# Patient Record
Sex: FEMALE | Race: WHITE | NOT HISPANIC OR LATINO | URBAN - METROPOLITAN AREA
[De-identification: names, ages, dates, MRNs, and addresses within clinical notes are randomized per-mention and may not be internally consistent; named-entity substitution may affect disease eponyms.]

---

## 2018-08-31 ENCOUNTER — OFFICE (OUTPATIENT)
Dept: URBAN - METROPOLITAN AREA CLINIC 4 | Facility: CLINIC | Age: 63
End: 2018-08-31

## 2018-08-31 VITALS — WEIGHT: 135 LBS | DIASTOLIC BLOOD PRESSURE: 67 MMHG | SYSTOLIC BLOOD PRESSURE: 106 MMHG | HEIGHT: 62 IN

## 2018-08-31 DIAGNOSIS — K62.5 HEMORRHAGE OF ANUS AND RECTUM: ICD-10-CM

## 2018-08-31 PROCEDURE — 99213 OFFICE O/P EST LOW 20 MIN: CPT | Performed by: NURSE PRACTITIONER

## 2018-08-31 RX ORDER — HYDROCORTISONE ACETATE AND PRAMOXINE HYDROCHLORIDE 25; 10 MG/G; MG/G
CREAM TOPICAL
Qty: 30 | Refills: 3 | Status: ACTIVE
Start: 2018-08-31

## 2018-10-09 ENCOUNTER — ON CAMPUS - OUTPATIENT (OUTPATIENT)
Dept: URBAN - METROPOLITAN AREA HOSPITAL 22 | Facility: HOSPITAL | Age: 63
End: 2018-10-09
Payer: COMMERCIAL

## 2018-10-09 DIAGNOSIS — Z86.010 PERSONAL HISTORY OF COLONIC POLYPS: ICD-10-CM

## 2018-10-09 DIAGNOSIS — Z80.9 FAMILY HISTORY OF MALIGNANT NEOPLASM, UNSPECIFIED: ICD-10-CM

## 2018-10-09 PROCEDURE — 45378 DIAGNOSTIC COLONOSCOPY: CPT | Mod: 33 | Performed by: INTERNAL MEDICINE

## 2019-05-16 ENCOUNTER — TELEPHONE (OUTPATIENT)
Dept: NEUROSURGERY | Facility: CLINIC | Age: 64
End: 2019-05-16

## 2019-05-16 DIAGNOSIS — D32.9 MENINGIOMA (HCC): Primary | ICD-10-CM

## 2019-06-11 ENCOUNTER — HOSPITAL ENCOUNTER (OUTPATIENT)
Dept: MRI IMAGING | Facility: HOSPITAL | Age: 64
Discharge: HOME | End: 2019-06-11
Admitting: NEUROLOGICAL SURGERY

## 2019-06-11 ENCOUNTER — OFFICE VISIT (OUTPATIENT)
Dept: NEUROSURGERY | Facility: CLINIC | Age: 64
End: 2019-06-11

## 2019-06-11 VITALS
TEMPERATURE: 97.3 F | SYSTOLIC BLOOD PRESSURE: 102 MMHG | DIASTOLIC BLOOD PRESSURE: 68 MMHG | WEIGHT: 133.2 LBS | BODY MASS INDEX: 23.6 KG/M2 | HEIGHT: 63 IN

## 2019-06-11 DIAGNOSIS — D32.9 MENINGIOMA (HCC): ICD-10-CM

## 2019-06-11 DIAGNOSIS — D32.9 MENINGIOMA (HCC): Primary | ICD-10-CM

## 2019-06-11 PROCEDURE — 0 GADOBENATE DIMEGLUMINE 529 MG/ML SOLUTION: Performed by: NEUROLOGICAL SURGERY

## 2019-06-11 PROCEDURE — 82565 ASSAY OF CREATININE: CPT

## 2019-06-11 PROCEDURE — 99213 OFFICE O/P EST LOW 20 MIN: CPT | Performed by: NEUROLOGICAL SURGERY

## 2019-06-11 PROCEDURE — A9577 INJ MULTIHANCE: HCPCS | Performed by: NEUROLOGICAL SURGERY

## 2019-06-11 PROCEDURE — 70553 MRI BRAIN STEM W/O & W/DYE: CPT

## 2019-06-11 RX ORDER — ZOLMITRIPTAN 2.5 MG/1
2.5 TABLET, FILM COATED ORAL ONCE AS NEEDED
COMMUNITY

## 2019-06-11 RX ORDER — ESCITALOPRAM OXALATE 20 MG/1
TABLET ORAL
COMMUNITY
Start: 2019-04-26

## 2019-06-11 RX ORDER — EVOLOCUMAB 140 MG/ML
INJECTION, SOLUTION SUBCUTANEOUS
COMMUNITY
Start: 2019-05-21

## 2019-06-11 RX ORDER — METOPROLOL SUCCINATE 25 MG
TABLET, EXTENDED RELEASE 24 HR ORAL
Refills: 1 | COMMUNITY
Start: 2019-03-12

## 2019-06-11 RX ADMIN — GADOBENATE DIMEGLUMINE 12 ML: 529 INJECTION, SOLUTION INTRAVENOUS at 14:04

## 2019-06-11 NOTE — PATIENT INSTRUCTIONS
If you need:     Call Dr. Means on a Monday or Tuesday with an update.      Ask for Desire () and leave a message for  Dr. Means.   He will call you back at the end of the day as soon as he can.     [phone]

## 2019-06-11 NOTE — PROGRESS NOTES
Jessica Guerriergrass  1955  [id number]      Chief Complaint   Patient presents with   • Brain Tumor       HISTORY OF PRESENT ILLNESS: This is a 64-year-old female who has been followed for several years with an asymptomatic meningioma in the pineal region.  He has remained asymptomatic.  She has not been seen for approximately 5 years and returns for follow-up MRI.    Past Medical History:   Diagnosis Date   • Headache        Past Surgical History:   Procedure Laterality Date   • CHOLECYSTECTOMY     • TUMOR REMOVAL      throat       Family History   Problem Relation Age of Onset   • Cancer Mother    • Heart disease Father        Social History     Socioeconomic History   • Marital status:      Spouse name: Not on file   • Number of children: Not on file   • Years of education: Not on file   • Highest education level: Not on file   Tobacco Use   • Smoking status: Never Smoker   • Smokeless tobacco: Never Used   Substance and Sexual Activity   • Alcohol use: Yes   • Drug use: No   • Sexual activity: Defer       Allergies   Allergen Reactions   • Aspirin GI Intolerance   • Codeine GI Intolerance   • Erythromycin GI Intolerance   • Penicillins Other (See Comments)     Tested positive for allergy    • Sulfa Antibiotics Other (See Comments)     Flu like symptoms           Current Outpatient Medications:   •  escitalopram (LEXAPRO) 20 MG tablet, , Disp: , Rfl:   •  REPATHA SURECLICK 140 MG/ML solution auto-injector, , Disp: , Rfl:   •  TOPROL XL 25 MG 24 hr tablet, TK ONE T PO QD WITH A MEAL, Disp: , Rfl: 1  •  ZOLMitriptan (ZOMIG) 2.5 MG tablet, Take 2.5 mg by mouth 1 (One) Time As Needed for Migraine., Disp: , Rfl:   No current facility-administered medications for this visit.     Review of Systems   Constitutional: Negative for activity change, appetite change, chills, diaphoresis, fatigue, fever and unexpected weight change.   HENT: Negative for congestion, dental problem, drooling, ear discharge, ear  "pain, facial swelling, hearing loss, mouth sores, nosebleeds, postnasal drip, rhinorrhea, sinus pressure, sneezing, sore throat, tinnitus, trouble swallowing and voice change.    Eyes: Negative for photophobia, pain, discharge, redness, itching and visual disturbance.   Respiratory: Negative for apnea, cough, choking, chest tightness, shortness of breath, wheezing and stridor.    Cardiovascular: Negative for chest pain, palpitations and leg swelling.   Gastrointestinal: Negative for abdominal distention, abdominal pain, anal bleeding, blood in stool, constipation, diarrhea, nausea, rectal pain and vomiting.   Endocrine: Negative for cold intolerance, heat intolerance, polydipsia, polyphagia and polyuria.   Genitourinary: Negative for decreased urine volume, difficulty urinating, dysuria, enuresis, flank pain, frequency, genital sores, hematuria and urgency.   Musculoskeletal: Positive for neck pain. Negative for arthralgias, back pain, gait problem, joint swelling, myalgias and neck stiffness.   Skin: Negative for color change, pallor, rash and wound.   Allergic/Immunologic: Negative for environmental allergies, food allergies and immunocompromised state.   Neurological: Negative for dizziness, tremors, seizures, syncope, facial asymmetry, speech difficulty, weakness, light-headedness, numbness and headaches.   Hematological: Negative for adenopathy. Does not bruise/bleed easily.   Psychiatric/Behavioral: Negative for agitation, behavioral problems, confusion, decreased concentration, dysphoric mood, hallucinations, self-injury, sleep disturbance and suicidal ideas. The patient is not nervous/anxious and is not hyperactive.        Vitals:    06/11/19 1530   BP: 102/68   BP Location: Left arm   Patient Position: Sitting   Cuff Size: Adult   Temp: 97.3 °F (36.3 °C)   TempSrc: Temporal   Weight: 60.4 kg (133 lb 3.2 oz)   Height: 158.8 cm (62.5\")       Neurological Examination:    Mental status/speech: The patient is " alert and oriented.  Speech is clear without aphysia or dysarthria.  No overt cognitive deficits.    Cranial nerve examination:    Olfaction: Smell is intact.  Vision: Vision is intact without visual field abnormalities.  Funduscopic examination is normal.  No pupillary irregularity.  Ocular motor examination: The extraocular muscles are intact.  There is no diplopia.  The pupil is round and reactive to both light and accommodation.  There is no nystagmus.  Facial movement/sensation: There is no facial weakness.  Sensation is intact in the first, second, and third divisions of the trigeminal nerve.  The corneal reflex is intact.  Auditory: Hearing is intact to finger rub bilaterally.  Cranial nerves IX, X, XI, XII: Phonation is normal.  No dysphagia.  Tongue is protruded in the midline without atrophy.  The gag reflex is intact.  Shoulder shrug is normal.    Musculoligamentous ligamentous examination: Weakness, sensory loss or reflex asymmetry.  Gait normal.    Medical Decision Making:     Diagnostic Data Set: The MRI data set shows slight enlargement of the neoplasm of approximately 3-4 mm.  There is no abnormality in the brain.  No evidence of edema or peritumoral abnormalities.      Assessment: Pineal region meningioma          Recommendations: Although she is asymptomatic we have MRI evidence that this is increased in size in the past 5 years.  I will discuss with S[facility] in my neurosurgical colleagues as to the best management at this time.  I will keep you informed.        I greatly appreciate the opportunity to see and evaluate this individual.  If you have questions or concerns regarding issues that I may have overlooked please call me at any time: [phone].  Koby Means M.D.  [facility]  [address] [place] [address]    Scribed for Pete Maens MD by Fernando Khan CMA. 6/11/2019  3:53 PM    I have read and concur with the information provided by the scribe.  Pete  MD Miguelangel

## 2019-06-14 LAB — CREAT BLDA-MCNC: 0.7 MG/DL (ref 0.6–1.3)

## 2019-08-12 ENCOUNTER — TRANSCRIBE ORDERS (OUTPATIENT)
Dept: ADMINISTRATIVE | Facility: HOSPITAL | Age: 64
End: 2019-08-12

## 2019-08-12 DIAGNOSIS — Z12.31 VISIT FOR SCREENING MAMMOGRAM: Primary | ICD-10-CM

## 2019-08-12 DIAGNOSIS — N63.22 BREAST LUMP ON LEFT SIDE AT 10 O'CLOCK POSITION: ICD-10-CM

## 2019-08-30 ENCOUNTER — APPOINTMENT (OUTPATIENT)
Dept: MAMMOGRAPHY | Facility: HOSPITAL | Age: 64
End: 2019-08-30

## 2019-09-04 ENCOUNTER — TELEPHONE (OUTPATIENT)
Dept: NEUROSURGERY | Facility: CLINIC | Age: 64
End: 2019-09-04

## 2019-09-04 NOTE — TELEPHONE ENCOUNTER
"Provider:  Miguelangel  Caller: Pt Daughter   Phone #:  [phone]  Surgery:  Brain Surgery @ [facility]  Surgery Date:  8/2019  Last visit:   6/11/19  Next visit: 9/10/19    JOSIE:         Reason for call:           Pt daughter called stating that the pt has returned home from [facility], states that the pt is having hallucinations, very irritable, and is telling \"strange stories\". Daughter is very concerned and is wanting to move the pt post op visit up if possible? Please adv  "

## 2019-09-04 NOTE — TELEPHONE ENCOUNTER
"Patient's surgery was 8/26/2019.  Daughter says her mother was having \"flashing light\" and seeing things in the hospital.  They started keppra, 1500 BID.  Flashing lights improved but she is still having vivid images when she closes her eyes and hallucinations when awake.  No neuro/cognitive changes. She is on decadron wean. Will discontinue Friday. No incisional issues. I reassured her this will take some time to clear. She should call our office if symptoms worsen or any neuro changes which I have discussed.   I have informed Dr. Means as well.  We will see patient next Tuesday.      I discussed above with patient's daughter  "

## 2019-09-10 ENCOUNTER — OFFICE VISIT (OUTPATIENT)
Dept: NEUROSURGERY | Facility: CLINIC | Age: 64
End: 2019-09-10

## 2019-09-10 VITALS
BODY MASS INDEX: 23.53 KG/M2 | HEIGHT: 63 IN | WEIGHT: 132.8 LBS | DIASTOLIC BLOOD PRESSURE: 66 MMHG | TEMPERATURE: 97 F | SYSTOLIC BLOOD PRESSURE: 100 MMHG

## 2019-09-10 DIAGNOSIS — D32.9 MENINGIOMA (HCC): Primary | ICD-10-CM

## 2019-09-10 DIAGNOSIS — Z98.890 STATUS POST CRANIOTOMY: ICD-10-CM

## 2019-09-10 PROBLEM — R56.9 SEIZURES (HCC): Status: ACTIVE | Noted: 2019-09-10

## 2019-09-10 PROCEDURE — 99214 OFFICE O/P EST MOD 30 MIN: CPT | Performed by: PHYSICIAN ASSISTANT

## 2019-09-10 RX ORDER — LEVETIRACETAM 750 MG/1
1500 TABLET ORAL 4 TIMES DAILY
COMMUNITY
Start: 2019-08-30 | End: 2019-09-10

## 2019-09-10 RX ORDER — LEVETIRACETAM 500 MG/1
500 TABLET ORAL 2 TIMES DAILY
Qty: 60 TABLET | Refills: 3 | Status: SHIPPED | OUTPATIENT
Start: 2019-09-10 | End: 2020-01-09 | Stop reason: SDUPTHER

## 2019-09-10 RX ORDER — OXYCODONE HYDROCHLORIDE 5 MG/1
TABLET ORAL
Refills: 0 | COMMUNITY
Start: 2019-08-31 | End: 2020-06-16

## 2019-09-10 RX ORDER — FLUTICASONE PROPIONATE 50 MCG
SPRAY, SUSPENSION (ML) NASAL
COMMUNITY

## 2019-09-10 RX ORDER — LORAZEPAM 0.5 MG/1
TABLET ORAL
Refills: 0 | COMMUNITY
Start: 2019-08-31 | End: 2020-06-16

## 2019-09-10 RX ORDER — ESTRADIOL 10 UG/1
INSERT VAGINAL
COMMUNITY
End: 2020-11-12 | Stop reason: SDUPTHER

## 2019-09-10 RX ORDER — LEVETIRACETAM 750 MG/1
750 TABLET ORAL 2 TIMES DAILY
Qty: 60 TABLET | Refills: 3 | Status: SHIPPED | OUTPATIENT
Start: 2019-09-10 | End: 2021-06-22

## 2019-09-10 RX ORDER — PANTOPRAZOLE SODIUM 20 MG/1
20 TABLET, DELAYED RELEASE ORAL
COMMUNITY
Start: 2019-08-30 | End: 2019-09-29

## 2019-09-10 RX ORDER — ASPIRIN 325 MG
325 TABLET ORAL DAILY
COMMUNITY
Start: 2019-08-31 | End: 2019-09-10

## 2019-09-10 NOTE — PROGRESS NOTES
Jessica Guerriergrass  1955  09/10/2019  [id number]    CC:for suture removal    HPI:  63 yo female with progression of a pineal region meningioma. She had craniotomy at [facility], she presents today for suture removal. She had MRI post op that Dr. Means has reviewed. 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 She now has clear vision and would like to decrease the Keppra is possible.    Past Medical History:   Diagnosis Date   • Headache        Allergies   Allergen Reactions   • Aspirin GI Intolerance   • Codeine GI Intolerance   • Erythromycin GI Intolerance   • Penicillins Other (See Comments)     Tested positive for allergy    • Sulfa Antibiotics Other (See Comments)     Flu like symptoms           Current Outpatient Medications:   •  aspirin 325 MG tablet, Take 325 mg by mouth Daily., Disp: , Rfl:   •  Bacillus Coagulans-Inulin (PROBIOTIC FORMULA PO), Probiotic Formula 10 billion cell(2 billion ea) capsule, Disp: , Rfl:   •  escitalopram (LEXAPRO) 20 MG tablet, , Disp: , Rfl:   •  estradiol (YUVAFEM) 10 MCG tablet vaginal tablet, Yuvafem 10 mcg vaginal tablet, Disp: , Rfl:   •  fluticasone (FLONASE) 50 MCG/ACT nasal spray, fluticasone propionate 50 mcg/actuation nasal spray,suspension, Disp: , Rfl:   •  levETIRAcetam (KEPPRA) 750 MG tablet, Take 1,500 mg by mouth 4 (Four) Times a Day., Disp: , Rfl:   •  LORazepam (ATIVAN) 0.5 MG tablet, PLEASE SEE ATTACHED FOR DETAILED DIRECTIONS, Disp: , Rfl: 0  •  oxyCODONE (ROXICODONE) 5 MG immediate release tablet, TAKE 1 TABLET BY MOUTH EVERY 4 HOURS AS NEEDED FOR PAIN. TAKE WITH FOOD. WEAN TO OFF AS DIRECTED, Disp: , Rfl: 0  •  pantoprazole (PROTONIX) 20 MG EC tablet, Take 20 mg by mouth., Disp: , Rfl:   •  REPATHA SURECLICK 140 MG/ML solution auto-injector, , Disp: , Rfl:   •  SENNOSIDES-DOCUSATE SODIUM PO, Take 1-2 tablets by mouth., Disp: , Rfl:   •  TOPROL XL 25 MG 24 hr tablet, TK ONE T PO QD WITH  A MEAL, Disp: , Rfl: 1  •  ZOLMitriptan (ZOMIG) 2.5 MG tablet, Take 2.5 mg by mouth 1 (One) Time As Needed for Migraine., Disp: , Rfl:     Review of Systems   Constitutional: Negative for activity change, appetite change, chills, diaphoresis, fatigue, fever and unexpected weight change.   HENT: Positive for mouth sores and rhinorrhea. Negative for congestion, dental problem, drooling, ear discharge, ear pain, facial swelling, hearing loss, nosebleeds, postnasal drip, sinus pressure, sneezing, sore throat, tinnitus, trouble swallowing and voice change.    Eyes: Negative for photophobia, pain, discharge, redness, itching and visual disturbance.   Respiratory: Negative for apnea, cough, choking, chest tightness, shortness of breath, wheezing and stridor.    Cardiovascular: Negative for chest pain, palpitations and leg swelling.   Gastrointestinal: Negative for abdominal distention, abdominal pain, anal bleeding, blood in stool, constipation, diarrhea, nausea, rectal pain and vomiting.   Endocrine: Negative for cold intolerance, heat intolerance, polydipsia, polyphagia and polyuria.   Genitourinary: Negative for decreased urine volume, difficulty urinating, dysuria, enuresis, flank pain, frequency, genital sores, hematuria and urgency.   Musculoskeletal: Negative for arthralgias, back pain, gait problem, joint swelling, myalgias, neck pain and neck stiffness.   Skin: Positive for rash. Negative for color change, pallor and wound.   Allergic/Immunologic: Negative for environmental allergies, food allergies and immunocompromised state.   Neurological: Positive for seizures. Negative for dizziness, tremors, syncope, facial asymmetry, speech difficulty, weakness, light-headedness, numbness and headaches.   Hematological: Negative for adenopathy. Does not bruise/bleed easily.   Psychiatric/Behavioral: Negative for agitation, behavioral problems, confusion, decreased concentration, dysphoric mood, hallucinations, self-injury,  "sleep disturbance and suicidal ideas. The patient is not nervous/anxious and is not hyperactive.    All other systems reviewed and are negative.      PE:  /66 (BP Location: Right arm, Patient Position: Sitting, Cuff Size: Adult)   Temp 97 °F (36.1 °C) (Temporal)   Ht 158.8 cm (62.5\")   Wt 60.2 kg (132 lb 12.8 oz)   BMI 23.90 kg/m²   Heart- RRR  Lungs- no wheezing, normal expansion    Wound-well healed, flat, no drainage. Staples were intact.    Neurologic Exam:  Motor and gait intact. Vision intact.    MDM   Patient will f/u in December with new brain MRI. She will call with any questions.    Ghazala Logan, PAC    "

## 2019-09-12 DIAGNOSIS — D32.9 MENINGIOMA (HCC): Primary | ICD-10-CM

## 2019-09-12 DIAGNOSIS — Z98.890 STATUS POST CRANIOTOMY: ICD-10-CM

## 2019-10-15 ENCOUNTER — TELEPHONE (OUTPATIENT)
Dept: NEUROSURGERY | Facility: CLINIC | Age: 64
End: 2019-10-15

## 2019-10-15 DIAGNOSIS — Z98.890 STATUS POST CRANIOTOMY: ICD-10-CM

## 2019-10-15 DIAGNOSIS — D32.9 MENINGIOMA (HCC): Primary | ICD-10-CM

## 2019-10-15 NOTE — TELEPHONE ENCOUNTER
Per Miguelangel he would like to see pt back in office in December with new scans which looks like is already scheduled.  I have called pt to let her know.      She informed me that [facility] wanted to order an EEG to get an update on her seizure activity but were unable to get it schedule while she was in town.  She asked if this is something we could order for her.  Miguelangel approved.

## 2019-10-15 NOTE — TELEPHONE ENCOUNTER
Provider:  Miguelangel  Caller: patient  Time of call:  1:59   Phone #:  [phone]  Surgery:  No-Meningioma  Surgery Date:    Last visit:   09/10/19  Next visit: 12/10/19    JOSIE:         Reason for call:     Patient called to let Dr. Means know that she had her post-op apt with [facility] today.    They suggested that she follows up with Dr. Means on her next visit.

## 2019-11-04 ENCOUNTER — TELEPHONE (OUTPATIENT)
Dept: NEUROSURGERY | Facility: CLINIC | Age: 64
End: 2019-11-04

## 2019-11-04 DIAGNOSIS — D32.9 MENINGIOMA (HCC): ICD-10-CM

## 2019-11-04 DIAGNOSIS — Z98.890 STATUS POST CRANIOTOMY: Primary | ICD-10-CM
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
## 2019-11-07 ENCOUNTER — OFFICE VISIT (OUTPATIENT)
Dept: NEUROSURGERY | Facility: CLINIC | Age: 64
End: 2019-11-07

## 2019-11-07 ENCOUNTER — HOSPITAL ENCOUNTER (OUTPATIENT)
Dept: CT IMAGING | Facility: HOSPITAL | Age: 64
Discharge: HOME | End: 2019-11-07
Admitting: PHYSICIAN ASSISTANT

## 2019-11-07 VITALS
DIASTOLIC BLOOD PRESSURE: 70 MMHG | HEIGHT: 66 IN | BODY MASS INDEX: 22.02 KG/M2 | SYSTOLIC BLOOD PRESSURE: 112 MMHG | WEIGHT: 137 LBS | TEMPERATURE: 97 F

## 2019-11-07 DIAGNOSIS — R56.9 SEIZURES (HCC): ICD-10-CM

## 2019-11-07 DIAGNOSIS — D32.9 MENINGIOMA (HCC): ICD-10-CM

## 2019-11-07 DIAGNOSIS — G44.89 OTHER HEADACHE SYNDROME: ICD-10-CM

## 2019-11-07 DIAGNOSIS — Z98.890 STATUS POST CRANIOTOMY: ICD-10-CM

## 2019-11-07 DIAGNOSIS — Z98.890 STATUS POST CRANIOTOMY: Primary | ICD-10-CM

## 2019-11-07 PROBLEM — R51.9 HEADACHE: Status: ACTIVE | Noted: 2019-11-07

## 2019-11-07 PROCEDURE — 70450 CT HEAD/BRAIN W/O DYE: CPT

## 2019-11-07 PROCEDURE — 99213 OFFICE O/P EST LOW 20 MIN: CPT | Performed by: PHYSICIAN ASSISTANT

## 2019-11-07 RX ORDER — PANTOPRAZOLE SODIUM 20 MG/1
20 TABLET, DELAYED RELEASE ORAL DAILY
COMMUNITY
End: 2019-11-07

## 2019-11-07 RX ORDER — PANTOPRAZOLE SODIUM 20 MG/1
20 TABLET, DELAYED RELEASE ORAL 2 TIMES DAILY
Qty: 60 TABLET | Refills: 5 | Status: SHIPPED | OUTPATIENT
Start: 2019-11-07 | End: 2020-12-10
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
## 2019-12-02 ENCOUNTER — APPOINTMENT (OUTPATIENT)
Dept: NEUROLOGY | Facility: HOSPITAL | Age: 64
End: 2019-12-02

## 2019-12-05 ENCOUNTER — HOSPITAL ENCOUNTER (OUTPATIENT)
Dept: NEUROLOGY | Facility: HOSPITAL | Age: 64
Discharge: HOME | End: 2019-12-05
Admitting: NEUROLOGICAL SURGERY

## 2019-12-05 PROCEDURE — 95816 EEG AWAKE AND DROWSY: CPT

## 2019-12-09 ENCOUNTER — HOSPITAL ENCOUNTER (OUTPATIENT)
Dept: ULTRASOUND IMAGING | Facility: HOSPITAL | Age: 64
Discharge: HOME | End: 2019-12-09

## 2019-12-09 ENCOUNTER — HOSPITAL ENCOUNTER (OUTPATIENT)
Dept: MAMMOGRAPHY | Facility: HOSPITAL | Age: 64
Discharge: HOME | End: 2019-12-09
Admitting: OBSTETRICS & GYNECOLOGY

## 2019-12-09 DIAGNOSIS — N63.22 BREAST LUMP ON LEFT SIDE AT 10 O'CLOCK POSITION: ICD-10-CM

## 2019-12-09 PROCEDURE — 77066 DX MAMMO INCL CAD BI: CPT | Performed by: RADIOLOGY

## 2019-12-09 PROCEDURE — 77062 BREAST TOMOSYNTHESIS BI: CPT | Performed by: RADIOLOGY

## 2019-12-09 PROCEDURE — 77066 DX MAMMO INCL CAD BI: CPT

## 2019-12-09 PROCEDURE — G0279 TOMOSYNTHESIS, MAMMO: HCPCS

## 2019-12-09 PROCEDURE — 76642 ULTRASOUND BREAST LIMITED: CPT

## 2019-12-09 PROCEDURE — 76642 ULTRASOUND BREAST LIMITED: CPT | Performed by: RADIOLOGY

## 2019-12-10 ENCOUNTER — OFFICE VISIT (OUTPATIENT)
Dept: NEUROSURGERY | Facility: CLINIC | Age: 64
End: 2019-12-10

## 2019-12-10 ENCOUNTER — HOSPITAL ENCOUNTER (OUTPATIENT)
Dept: MRI IMAGING | Facility: HOSPITAL | Age: 64
Discharge: HOME | End: 2019-12-10
Admitting: NEUROLOGICAL SURGERY

## 2019-12-10 ENCOUNTER — HOSPITAL ENCOUNTER (OUTPATIENT)
Dept: MRI IMAGING | Facility: HOSPITAL | Age: 64
Discharge: HOME | End: 2019-12-10

## 2019-12-10 ENCOUNTER — APPOINTMENT (OUTPATIENT)
Dept: MRI IMAGING | Facility: HOSPITAL | Age: 64
End: 2019-12-10

## 2019-12-10 VITALS
WEIGHT: 138 LBS | HEIGHT: 63 IN | BODY MASS INDEX: 24.45 KG/M2 | TEMPERATURE: 97.5 F | SYSTOLIC BLOOD PRESSURE: 114 MMHG | HEART RATE: 68 BPM | DIASTOLIC BLOOD PRESSURE: 70 MMHG

## 2019-12-10 DIAGNOSIS — D32.9 MENINGIOMA (HCC): Primary | ICD-10-CM

## 2019-12-10 DIAGNOSIS — Z98.890 STATUS POST CRANIOTOMY: ICD-10-CM

## 2019-12-10 DIAGNOSIS — D32.9 MENINGIOMA (HCC): ICD-10-CM

## 2019-12-10 PROCEDURE — 0 GADOBENATE DIMEGLUMINE 529 MG/ML SOLUTION: Performed by: PHYSICIAN ASSISTANT

## 2019-12-10 PROCEDURE — 99213 OFFICE O/P EST LOW 20 MIN: CPT | Performed by: NEUROLOGICAL SURGERY

## 2019-12-10 PROCEDURE — 82565 ASSAY OF CREATININE: CPT

## 2019-12-10 PROCEDURE — 70553 MRI BRAIN STEM W/O & W/DYE: CPT

## 2019-12-10 PROCEDURE — A9577 INJ MULTIHANCE: HCPCS | Performed by: PHYSICIAN ASSISTANT

## 2019-12-10 PROCEDURE — 70544 MR ANGIOGRAPHY HEAD W/O DYE: CPT

## 2019-12-10 RX ADMIN — GADOBENATE DIMEGLUMINE 10 ML: 529 INJECTION, SOLUTION INTRAVENOUS at 14:37

## 2019-12-10 NOTE — PROGRESS NOTES
Jessica Guerriergrass  1955  [id number]                        CHIEF COMPLAINT:  [Post resection of pineal region meningioma]         MEDICAL HISTORY SINCE LAST ENCOUNTER:  [This 64-year-old reports today for follow-up having undergone excision of a pineal region meningioma in August 2019.  She is doing quite well.  Has no specific complaints at this time.]           Past Medical History:   Diagnosis Date   • Headache               Past Surgical History:   Procedure Laterality Date   • CHOLECYSTECTOMY     • CRANIOTOMY  8/2019   • TUMOR REMOVAL      throat              Family History   Problem Relation Age of Onset   • Cancer Mother    • Heart disease Father    • Ovarian cancer Maternal Aunt    • Breast cancer Neg Hx               Social History     Socioeconomic History   • Marital status:      Spouse name: Not on file   • Number of children: Not on file   • Years of education: Not on file   • Highest education level: Not on file   Tobacco Use   • Smoking status: Never Smoker   • Smokeless tobacco: Never Used   Substance and Sexual Activity   • Alcohol use: Yes   • Drug use: No   • Sexual activity: Defer              Allergies   Allergen Reactions   • Aspirin GI Intolerance   • Codeine GI Intolerance   • Erythromycin GI Intolerance   • Penicillins Other (See Comments)     Tested positive for allergy    • Sulfa Antibiotics Other (See Comments)     Flu like symptoms                Current Outpatient Medications:   •  aspirin 81 MG tablet, Take 1 tablet by mouth Daily., Disp: 30 tablet, Rfl: 5  •  Bacillus Coagulans-Inulin (PROBIOTIC FORMULA PO), Probiotic Formula 10 billion cell(2 billion ea) capsule, Disp: , Rfl:   •  escitalopram (LEXAPRO) 20 MG tablet, , Disp: , Rfl:   •  estradiol (YUVAFEM) 10 MCG tablet vaginal tablet, Yuvafem 10 mcg vaginal tablet, Disp: , Rfl:   •  fluticasone (FLONASE) 50 MCG/ACT nasal spray, fluticasone propionate 50 mcg/actuation nasal spray,suspension, Disp: , Rfl:   •   levETIRAcetam (KEPPRA) 500 MG tablet, Take 1 tablet by mouth 2 (Two) Times a Day., Disp: 60 tablet, Rfl: 3  •  LORazepam (ATIVAN) 0.5 MG tablet, PLEASE SEE ATTACHED FOR DETAILED DIRECTIONS, Disp: , Rfl: 0  •  oxyCODONE (ROXICODONE) 5 MG immediate release tablet, TAKE 1 TABLET BY MOUTH EVERY 4 HOURS AS NEEDED FOR PAIN. TAKE WITH FOOD. WEAN TO OFF AS DIRECTED, Disp: , Rfl: 0  •  pantoprazole (PROTONIX) 20 MG EC tablet, Take 1 tablet by mouth 2 (Two) Times a Day., Disp: 60 tablet, Rfl: 5  •  REPATHA SURECLICK 140 MG/ML solution auto-injector, , Disp: , Rfl:   •  TOPROL XL 25 MG 24 hr tablet, TK ONE T PO QD WITH A MEAL, Disp: , Rfl: 1  •  ZOLMitriptan (ZOMIG) 2.5 MG tablet, Take 2.5 mg by mouth 1 (One) Time As Needed for Migraine., Disp: , Rfl:   No current facility-administered medications for this visit.          Review of Systems   Constitutional: Negative for activity change, appetite change, chills, diaphoresis, fatigue, fever and unexpected weight change.   HENT: Negative for congestion, dental problem, drooling, ear discharge, ear pain, facial swelling, hearing loss, mouth sores, nosebleeds, postnasal drip, rhinorrhea, sinus pressure, sneezing, sore throat, tinnitus, trouble swallowing and voice change.    Eyes: Negative for photophobia, pain, discharge, redness, itching and visual disturbance.   Respiratory: Negative for apnea, cough, choking, chest tightness, shortness of breath, wheezing and stridor.    Cardiovascular: Negative for chest pain, palpitations and leg swelling.   Gastrointestinal: Negative for abdominal distention, abdominal pain, anal bleeding, blood in stool, constipation, diarrhea, nausea, rectal pain and vomiting.   Endocrine: Negative for cold intolerance, heat intolerance, polydipsia, polyphagia and polyuria.   Genitourinary: Negative for decreased urine volume, difficulty urinating, dysuria, enuresis, flank pain, frequency, genital sores, hematuria and urgency.   Musculoskeletal: Negative  "for arthralgias, back pain, gait problem, joint swelling, myalgias, neck pain and neck stiffness.   Skin: Negative for color change, pallor, rash and wound.   Allergic/Immunologic: Negative for environmental allergies, food allergies and immunocompromised state.   Neurological: Negative for dizziness, tremors, seizures, syncope, facial asymmetry, speech difficulty, weakness, light-headedness, numbness and headaches.   Hematological: Negative for adenopathy. Does not bruise/bleed easily.   Psychiatric/Behavioral: Negative for agitation, behavioral problems, confusion, decreased concentration, dysphoric mood, hallucinations, self-injury, sleep disturbance and suicidal ideas. The patient is not nervous/anxious and is not hyperactive.                Vitals:    12/10/19 1454   BP: 114/70   BP Location: Left arm   Patient Position: Sitting   Cuff Size: Adult   Pulse: 68   Temp: 97.5 °F (36.4 °C)   Weight: 62.6 kg (138 lb)   Height: 160 cm (63\")               EXAMINATION: Visual fields are full to confrontation.  Extraocular muscles are full.  There is no weakness or ataxia.            MEDICAL DECISION MAKING: MRI shows no evidence of recurrence or progression.           ASSESSMENT/DISPOSITION: Return to see us in 6 weeks for continued follow-up.  I will keep you informed              I APPRECIATE THE OPPORTUNITY OF THIS REFERRAL. PLEASE CALL IF ANY       QUESTIONS [phone]    Scribed for Pete Means MD by Fernando Khan CMA 12/10/2019  3:21 PM     I have read and concur with the information provided by the scribe.  Pete Means MD        "

## 2019-12-11 ENCOUNTER — TELEPHONE (OUTPATIENT)
Dept: NEUROSURGERY | Facility: CLINIC | Age: 64
End: 2019-12-11

## 2019-12-11 NOTE — TELEPHONE ENCOUNTER
Provider:  Miguelangel  Caller: Pt   Phone #:  [phone]  Surgery:  [facility]   Surgery Date:  08/2019  Last visit:   Yesterday 12/10/19  Next visit: 06/16/20    Reason for call:         Please see Virginia's message

## 2019-12-11 NOTE — TELEPHONE ENCOUNTER
----- Message from Virginia Zuniga sent at 12/10/2019  3:49 PM EST -----  Regarding: d/c Aspirin (Means)  Patient asked if she could discontinue the 81-mg prescription aspirin. Even though it is coated it still upsets her stomach.  Please call to discuss with patient.  Thank you.

## 2019-12-13 LAB — CREAT BLDA-MCNC: 0.8 MG/DL (ref 0.6–1.3)

## 2020-01-09 DIAGNOSIS — D32.9 MENINGIOMA (HCC): Primary | ICD-10-CM

## 2020-01-09 RX ORDER — LEVETIRACETAM 500 MG/1
500 TABLET ORAL 2 TIMES DAILY
Qty: 60 TABLET | Refills: 3 | Status: SHIPPED | OUTPATIENT
Start: 2020-01-09 | End: 2020-03-12 | Stop reason: SDUPTHER

## 2020-01-09 NOTE — TELEPHONE ENCOUNTER
Provider:  Miguelangel  Caller: fax-pharmacy  Time of call:   7:20pm  Phone #:  [phone]  Surgery:  No-meningioma  Surgery Date:    Last visit:   12/10/19  Next visit: 06/16/20    JOSIE:         Reason for call:     Patient needs refill on Keppra 500 mg.

## 2020-02-21 RX ORDER — LEVETIRACETAM 750 MG/1
TABLET ORAL
Qty: 60 TABLET | OUTPATIENT
Start: 2020-02-21

## 2020-02-24 DIAGNOSIS — D32.9 MENINGIOMA (HCC): Primary | ICD-10-CM

## 2020-02-24 RX ORDER — LEVETIRACETAM 750 MG/1
750 TABLET ORAL 2 TIMES DAILY
Qty: 60 TABLET | Refills: 3 | Status: SHIPPED | OUTPATIENT
Start: 2020-02-24 | End: 2020-06-16 | Stop reason: SDUPTHER

## 2020-02-24 NOTE — TELEPHONE ENCOUNTER
Provider:  Miguelangel  Caller: patient  Time of call:   4:08  Phone #:  [phone]  Surgery:  Meningioma  Surgery Date:  none  Last visit:   12/10/19  Next visit: 06/16/20    JOSIE:         Reason for call:     Patient did receive her refill on Keppra for the 500 mg, but she is on 750 mg bid as well.    She is currently in [place] and would like that sent to [facility] if approved.

## 2020-03-12 DIAGNOSIS — D32.9 MENINGIOMA (HCC): ICD-10-CM

## 2020-03-13 RX ORDER — LEVETIRACETAM 500 MG/1
500 TABLET ORAL 2 TIMES DAILY
Qty: 60 TABLET | Refills: 3 | Status: SHIPPED | OUTPATIENT
Start: 2020-03-13 | End: 2020-06-16

## 2020-03-23 ENCOUNTER — TELEPHONE (OUTPATIENT)
Dept: NEUROSURGERY | Facility: CLINIC | Age: 65
End: 2020-03-23

## 2020-03-23 DIAGNOSIS — D32.9 MENINGIOMA (HCC): Primary | ICD-10-CM

## 2020-03-23 NOTE — TELEPHONE ENCOUNTER
Patient called on 3/23/20 to let our office know she went to the ER in [place] where she is right now.  Please call patient.

## 2020-03-23 NOTE — TELEPHONE ENCOUNTER
----- Message from Jessica Feliciano sent at 3/23/2020  2:02 PM EDT -----  Regarding: Non-Urgent Medical Question  Contact: [phone]  A couple of days ago, I worked a puzzle for hours. Shortly after I went to sleep, I woke up to what I thought could be a stroke or a seizure. My mouth was numb, my tongue felt strange and the EMT thought I was having convulsions-but I never lost consciousness. Even though the doc at the hospital didn't do an EEG, she concluded that it was a panic attack. I'm doing ok now, but if I go near the puzzle or try to concentrate, I'm irritated. I'm no expert by any means, but I feel that my brain hasn't healed enough from my surgery to spend that many hours working that puzzle. Could this be possible?  Before I went to sleep that night, I had some movement in my eyes after I closed them which is why I wondered if it could be seizure related. We are still in [place], my  is having surgery on May 20 to remove his malignant bladder, or I would request an appt with you/Dr. Means. I'm scheduled to see Dr. Quesada at [facility] on May 4, and we plan to drive up to that appt if the COVID19 allows  it. Thanks for your time, no rush in getting back to me, I'm feeling well, just a little agitated.     Jessica Feliciano

## 2020-06-03 ENCOUNTER — TELEPHONE (OUTPATIENT)
Dept: NEUROSURGERY | Facility: CLINIC | Age: 65
End: 2020-06-03

## 2020-06-03 NOTE — TELEPHONE ENCOUNTER
----- Message from Jessica Feliciano sent at 6/2/2020  7:27 PM EDT -----  Regarding: Non-Urgent Medical Question  Contact: [phone]  Can you give me a call please, [phone]. I am having problems with both shoulders, possibly torn rotator cuffs or frozen rotator cuffs. Also, Dr. Quesada is reducing my Levetiracetam 500 tablets in case it is causing my pain. I have another telephone appt with him on June 9. My question:  I have an MRI on my brain through your office on June 16. Is it possible to have this MRI to include my shoulders to eliminate an additional MRI?  Thanks for your time.     Tsering Feliciano

## 2020-06-03 NOTE — TELEPHONE ENCOUNTER
Patient notified in detail via voice mail.  I asked her to give me a call back if she had any questions or concerns.

## 2020-06-03 NOTE — TELEPHONE ENCOUNTER
----- Message from Jessica Feliciano sent at 6/2/2020  7:27 PM EDT -----  Regarding: Non-Urgent Medical Question  Contact: [phone]  Can you give me a call please, [phone]. I am having problems with both shoulders, possibly torn rotator cuffs or frozen rotator cuffs. Also, Dr. Quesada is reducing my Levetiracetam 500 tablets in case it is causing my pain. I have another telephone appt with him on June 9. My question:  I have an MRI on my brain through your office on June 16. Is it possible to have this MRI to include my shoulders to eliminate an additional MRI?  Thanks for your time.     Tsering Feliciano

## 2020-06-03 NOTE — TELEPHONE ENCOUNTER
This patient seen any provider for her shoulders?  We have not seen patient in our office since December. Her insurance most likely would not cover an MRI of shoulder.  I would advise patient to see her PCP or an orthopedic for further evaluation of her shoulders.

## 2020-06-16 ENCOUNTER — OFFICE VISIT (OUTPATIENT)
Dept: NEUROSURGERY | Facility: CLINIC | Age: 65
End: 2020-06-16

## 2020-06-16 ENCOUNTER — HOSPITAL ENCOUNTER (OUTPATIENT)
Dept: MRI IMAGING | Facility: HOSPITAL | Age: 65
Discharge: HOME | End: 2020-06-16
Admitting: NEUROLOGICAL SURGERY

## 2020-06-16 VITALS
SYSTOLIC BLOOD PRESSURE: 121 MMHG | RESPIRATION RATE: 15 BRPM | TEMPERATURE: 98 F | WEIGHT: 135 LBS | BODY MASS INDEX: 23.92 KG/M2 | HEIGHT: 63 IN | DIASTOLIC BLOOD PRESSURE: 69 MMHG

## 2020-06-16 DIAGNOSIS — M25.511 ACUTE PAIN OF BOTH SHOULDERS: ICD-10-CM

## 2020-06-16 DIAGNOSIS — Z98.890 STATUS POST CRANIOTOMY: ICD-10-CM

## 2020-06-16 DIAGNOSIS — D32.9 MENINGIOMA (HCC): ICD-10-CM

## 2020-06-16 DIAGNOSIS — M25.512 ACUTE PAIN OF BOTH SHOULDERS: ICD-10-CM

## 2020-06-16 DIAGNOSIS — D32.9 MENINGIOMA (HCC): Primary | ICD-10-CM

## 2020-06-16 PROCEDURE — 0 GADOBENATE DIMEGLUMINE 529 MG/ML SOLUTION: Performed by: NEUROLOGICAL SURGERY

## 2020-06-16 PROCEDURE — 99213 OFFICE O/P EST LOW 20 MIN: CPT | Performed by: NEUROLOGICAL SURGERY

## 2020-06-16 PROCEDURE — 82565 ASSAY OF CREATININE: CPT

## 2020-06-16 PROCEDURE — A9577 INJ MULTIHANCE: HCPCS | Performed by: NEUROLOGICAL SURGERY

## 2020-06-16 PROCEDURE — 70553 MRI BRAIN STEM W/O & W/DYE: CPT

## 2020-06-16 RX ORDER — MELOXICAM 7.5 MG/1
7.5 TABLET ORAL 2 TIMES DAILY
Qty: 60 TABLET | Refills: 0 | Status: SHIPPED | OUTPATIENT
Start: 2020-06-16 | End: 2020-12-10

## 2020-06-16 RX ORDER — LEVETIRACETAM 750 MG/1
750 TABLET ORAL 2 TIMES DAILY
Qty: 60 TABLET | Refills: 6 | Status: SHIPPED | OUTPATIENT
Start: 2020-06-16 | End: 2020-12-14

## 2020-06-16 RX ADMIN — GADOBENATE DIMEGLUMINE 10 ML: 529 INJECTION, SOLUTION INTRAVENOUS at 10:44

## 2020-06-16 NOTE — PATIENT INSTRUCTIONS
Call Dr. Means on a Monday or Tuesday and leave a message.     He will call you back at the end of the day as soon as he can.     [phone] [facility]   [phone] - [place]  [phone] - [place]

## 2020-06-16 NOTE — PROGRESS NOTES
Jessica Guerriergrass  1955  [id number]                        CHIEF COMPLAINT: Status post resection of pineal region meningioma         MEDICAL HISTORY SINCE LAST ENCOUNTER: 65-year-old approaching 1 year resection of meningioma in the pineal region and continues to do well without specific complaint.  He has had no further seizures.  She is taking Keppra 750 mg daily.           Past Medical History:   Diagnosis Date   • Headache               Past Surgical History:   Procedure Laterality Date   • CHOLECYSTECTOMY     • CRANIOTOMY  8/2019   • TUMOR REMOVAL      throat              Family History   Problem Relation Age of Onset   • Cancer Mother    • Heart disease Father    • Ovarian cancer Maternal Aunt    • Breast cancer Neg Hx               Social History     Socioeconomic History   • Marital status:      Spouse name: Not on file   • Number of children: Not on file   • Years of education: Not on file   • Highest education level: Not on file   Tobacco Use   • Smoking status: Never Smoker   • Smokeless tobacco: Never Used   Substance and Sexual Activity   • Alcohol use: Yes   • Drug use: No   • Sexual activity: Defer              Allergies   Allergen Reactions   • Aspirin GI Intolerance   • Codeine GI Intolerance   • Erythromycin GI Intolerance   • Penicillins Other (See Comments)     Tested positive for allergy    • Sulfa Antibiotics Other (See Comments)     Flu like symptoms                Current Outpatient Medications:   •  Bacillus Coagulans-Inulin (PROBIOTIC FORMULA PO), Probiotic Formula 10 billion cell(2 billion ea) capsule, Disp: , Rfl:   •  escitalopram (LEXAPRO) 20 MG tablet, , Disp: , Rfl:   •  estradiol (YUVAFEM) 10 MCG tablet vaginal tablet, Yuvafem 10 mcg vaginal tablet, Disp: , Rfl:   •  fluticasone (FLONASE) 50 MCG/ACT nasal spray, fluticasone propionate 50 mcg/actuation nasal spray,suspension, Disp: , Rfl:   •  levETIRAcetam (KEPPRA) 750 MG tablet, Take 1 tablet by mouth 2 (Two)  Times a Day., Disp: 60 tablet, Rfl: 3  •  pantoprazole (PROTONIX) 20 MG EC tablet, Take 1 tablet by mouth 2 (Two) Times a Day., Disp: 60 tablet, Rfl: 5  •  REPATHA SURECLICK 140 MG/ML solution auto-injector, , Disp: , Rfl:   •  TOPROL XL 25 MG 24 hr tablet, TK ONE T PO QD WITH A MEAL, Disp: , Rfl: 1  •  ZOLMitriptan (ZOMIG) 2.5 MG tablet, Take 2.5 mg by mouth 1 (One) Time As Needed for Migraine., Disp: , Rfl:   No current facility-administered medications for this visit.          Review of Systems   Constitutional: Negative for activity change, appetite change, chills, diaphoresis, fatigue, fever and unexpected weight change.   HENT: Negative for congestion, dental problem, drooling, ear discharge, ear pain, facial swelling, hearing loss, mouth sores, nosebleeds, postnasal drip, rhinorrhea, sinus pressure, sneezing, sore throat, tinnitus, trouble swallowing and voice change.    Eyes: Negative for photophobia, pain, discharge, redness, itching and visual disturbance.   Respiratory: Negative for apnea, cough, choking, chest tightness, shortness of breath, wheezing and stridor.    Cardiovascular: Negative for chest pain, palpitations and leg swelling.   Gastrointestinal: Negative for abdominal distention, abdominal pain, anal bleeding, blood in stool, constipation, diarrhea, nausea, rectal pain and vomiting.   Endocrine: Negative for cold intolerance, heat intolerance, polydipsia, polyphagia and polyuria.   Genitourinary: Negative for decreased urine volume, difficulty urinating, dysuria, enuresis, flank pain, frequency, genital sores, hematuria and urgency.   Musculoskeletal: Positive for arthralgias, myalgias and neck pain. Negative for back pain, gait problem, joint swelling and neck stiffness.   Skin: Negative for color change, pallor, rash and wound.   Allergic/Immunologic: Positive for environmental allergies and food allergies. Negative for immunocompromised state.   Neurological: Negative for dizziness,  "tremors, seizures, syncope, facial asymmetry, speech difficulty, weakness, light-headedness, numbness and headaches.   Hematological: Negative for adenopathy. Does not bruise/bleed easily.   Psychiatric/Behavioral: Negative for agitation, behavioral problems, confusion, decreased concentration, dysphoric mood, hallucinations, self-injury, sleep disturbance and suicidal ideas. The patient is nervous/anxious. The patient is not hyperactive.    All other systems reviewed and are negative.              Vitals:    06/16/20 1252   BP: 121/69   BP Location: Right arm   Patient Position: Sitting   Cuff Size: Adult   Resp: 15   Temp: 98 °F (36.7 °C)   TempSrc: Temporal   Weight: 61.2 kg (135 lb)   Height: 160 cm (63\")               EXAMINATION: Alert and oriented.  EOMs are full without pupillary inequality.            MEDICAL DECISION MAKING: MRI shows no evidence of progression or recurrence at this time           ASSESSMENT/DISPOSITION: We will reduce her Keppra to 750 mg daily and plan on weaning that shortly.  She has had no seizures therefore the likelihood of her having seizure is remote.  I will see her in 6 months for continued follow-up.    She is having issues in her shoulder which are related to of mild degenerative change and overuse.  Physical therapy and meloxicam 7.5 mg should help.  She will call me on as-needed basis.              I APPRECIATE THE OPPORTUNITY OF THIS REFERRAL. PLEASE CALL IF ANY       QUESTIONS [phone]          "

## 2020-06-21 LAB — CREAT BLDA-MCNC: 0.8 MG/DL (ref 0.6–1.3)

## 2020-09-03 ENCOUNTER — LAB REQUISITION (OUTPATIENT)
Dept: LAB | Facility: HOSPITAL | Age: 65
End: 2020-09-03

## 2020-09-03 DIAGNOSIS — Z00.00 ENCOUNTER FOR GENERAL ADULT MEDICAL EXAMINATION WITHOUT ABNORMAL FINDINGS: ICD-10-CM

## 2020-09-05 PROCEDURE — U0004 COV-19 TEST NON-CDC HGH THRU: HCPCS | Performed by: SPECIALIST

## 2020-09-07 LAB — SARS-COV-2 RNA RESP QL NAA+PROBE: NOT DETECTED

## 2020-10-03 ENCOUNTER — LAB REQUISITION (OUTPATIENT)
Dept: LAB | Facility: HOSPITAL | Age: 65
End: 2020-10-03

## 2020-10-03 DIAGNOSIS — Z00.00 ENCOUNTER FOR GENERAL ADULT MEDICAL EXAMINATION WITHOUT ABNORMAL FINDINGS: ICD-10-CM

## 2020-10-03 PROCEDURE — U0004 COV-19 TEST NON-CDC HGH THRU: HCPCS | Performed by: SPECIALIST

## 2020-10-05 LAB — SARS-COV-2 RNA RESP QL NAA+PROBE: NOT DETECTED

## 2020-11-12 ENCOUNTER — OFFICE VISIT (OUTPATIENT)
Dept: OBSTETRICS AND GYNECOLOGY | Facility: CLINIC | Age: 65
End: 2020-11-12

## 2020-11-12 VITALS — BODY MASS INDEX: 24.3 KG/M2 | DIASTOLIC BLOOD PRESSURE: 70 MMHG | SYSTOLIC BLOOD PRESSURE: 110 MMHG | HEIGHT: 63 IN

## 2020-11-12 DIAGNOSIS — Z01.419 PAP TEST, AS PART OF ROUTINE GYNECOLOGICAL EXAMINATION: Primary | ICD-10-CM

## 2020-11-12 PROCEDURE — G0101 CA SCREEN;PELVIC/BREAST EXAM: HCPCS | Performed by: OBSTETRICS & GYNECOLOGY

## 2020-11-12 RX ORDER — OMEGA-3S/DHA/EPA/FISH OIL/D3 300MG-1000
400 CAPSULE ORAL DAILY
COMMUNITY

## 2020-11-12 RX ORDER — ESTRADIOL 10 UG/1
1 INSERT VAGINAL 2 TIMES WEEKLY
Qty: 8 TABLET | Refills: 11 | Status: SHIPPED | OUTPATIENT
Start: 2020-11-12 | End: 2020-12-12

## 2020-11-12 NOTE — PROGRESS NOTES
GYN Annual Exam     CC - Here for annual exam.        HPI  Jessica Feliciano is a 65 y.o. female, , who presents for annual well woman exam.  She is postmenopausal.  Patient denies vaginal bleeding. ..  Patient reports problems with: none. Since her last visit the patient underwent surgery for frozen shoulder. Partner Status: Marital Status: .  New Partners since last visit: no.      Additional OB/GYN History   Current contraception: contraceptive methods: Post menopausal status  Desires to: do not start contraception  On HRT? Yes. Details: vagifem-needs RF  Last Pap :   Last Completed Pap Smear       Status Date      PAP SMEAR Done 2019 negative        History of abnormal Pap smear: yes - h/o cryo  Family history of uterine, colon, breast, or ovarian cancer: yes - maternal family h/o colon, uterine/ovarian CA  Performs monthly Self-Breast Exam: yes  Last mammogram:   Last Completed Mammogram       Status Date      MAMMOGRAM Done 2019 MAMMO DIAGNOSTIC DIGITAL TOMOSYNTHESIS BILATERAL W CAD        Last colonoscopy:   Last Completed Colonoscopy       Status Date      COLONOSCOPY Done 2018 WNL with Dr. See        Last DEXA: On unsure of date and results were Normal  Exercises Regularly: no  Feelings of Anxiety or Depression: no      Tobacco Usage?: No   OB History        3    Para   3    Term   2            AB        Living           SAB        TAB        Ectopic        Molar        Multiple        Live Births   1                Health Maintenance   Topic Date Due   • TDAP/TD VACCINES (2 - Tdap) 2015   • HEPATITIS C SCREENING  2019   • ANNUAL WELLNESS VISIT  2019   • ZOSTER VACCINE (2 of 2) 2019   • Pneumococcal Vaccine 65+ (1 of 1 - PPSV23) 2020   • INFLUENZA VACCINE  2020   • PAP SMEAR  2021   • MAMMOGRAM  2021   • COLONOSCOPY  2023       The additional following portions of the patient's history were reviewed  "and updated as appropriate: allergies, current medications, past family history, past medical history, past social history, past surgical history and problem list.    Review of Systems   Constitutional: Negative.    HENT: Negative.    Respiratory: Negative.    Cardiovascular: Negative.    Gastrointestinal: Negative.    Genitourinary: Negative.    Musculoskeletal: Negative.    Skin: Negative.    Allergic/Immunologic: Negative.    Neurological: Negative.    Hematological: Negative.    Psychiatric/Behavioral: Negative.      All other systems reviewed and are negative.     I have reviewed and agree with the HPI, ROS, and historical information as entered above. Kelley Villeda MD    Objective   /70   Ht 158.8 cm (62.5\")   BMI 24.30 kg/m²     Physical Exam  Vitals signs and nursing note reviewed. Exam conducted with a chaperone present.   Constitutional:       Appearance: She is well-developed.   HENT:      Head: Normocephalic and atraumatic.   Neck:      Musculoskeletal: Normal range of motion. No muscular tenderness.      Thyroid: No thyroid mass or thyromegaly.   Cardiovascular:      Rate and Rhythm: Normal rate and regular rhythm.      Heart sounds: No murmur.   Pulmonary:      Effort: Pulmonary effort is normal. No retractions.      Breath sounds: Normal breath sounds. No wheezing, rhonchi or rales.   Chest:      Chest wall: No mass or tenderness.      Breasts:         Right: Normal. No mass, nipple discharge, skin change or tenderness.         Left: Normal. No mass, nipple discharge, skin change or tenderness.   Abdominal:      General: Bowel sounds are normal.      Palpations: Abdomen is soft. Abdomen is not rigid. There is no mass.      Tenderness: There is no abdominal tenderness. There is no guarding.      Hernia: No hernia is present. There is no hernia in the left inguinal area.   Genitourinary:     Labia:         Right: No rash, tenderness or lesion.         Left: No rash, tenderness or lesion.       " Vagina: Normal. No vaginal discharge or lesions.      Cervix: No cervical motion tenderness, discharge, lesion or cervical bleeding.      Uterus: Normal. Not enlarged, not fixed and not tender.       Adnexa:         Right: No mass or tenderness.          Left: No mass or tenderness.        Rectum: No external hemorrhoid.   Neurological:      Mental Status: She is alert and oriented to person, place, and time.   Psychiatric:         Behavior: Behavior normal.            Assessment and Plan    Problem List Items Addressed This Visit     None      Visit Diagnoses     Pap test, as part of routine gynecological examination    -  Primary    Relevant Orders    Pap IG, Rfx HPV ASCU      Patient had craniotomy for benign brain tumor 1 year ago    1. GYN annual well woman exam.   2. Reviewed monthly self breast exams.  Instructed to call with lumps, pain, or breast discharge.  Yearly mammograms ordered.  3. RTC in 1 year or PRN with problems.    Kelley Villeda MD  11/12/2020

## 2020-11-17 DIAGNOSIS — Z01.419 PAP TEST, AS PART OF ROUTINE GYNECOLOGICAL EXAMINATION: ICD-10-CM

## 2020-12-10 ENCOUNTER — HOSPITAL ENCOUNTER (OUTPATIENT)
Dept: MRI IMAGING | Facility: HOSPITAL | Age: 65
Discharge: HOME | End: 2020-12-10
Admitting: NEUROLOGICAL SURGERY

## 2020-12-10 ENCOUNTER — OFFICE VISIT (OUTPATIENT)
Dept: NEUROSURGERY | Facility: CLINIC | Age: 65
End: 2020-12-10

## 2020-12-10 VITALS
SYSTOLIC BLOOD PRESSURE: 106 MMHG | DIASTOLIC BLOOD PRESSURE: 68 MMHG | BODY MASS INDEX: 23.92 KG/M2 | WEIGHT: 135 LBS | HEIGHT: 63 IN | TEMPERATURE: 97.3 F

## 2020-12-10 DIAGNOSIS — D32.9 MENINGIOMA (HCC): Primary | ICD-10-CM

## 2020-12-10 DIAGNOSIS — R42 VERTIGO: ICD-10-CM

## 2020-12-10 DIAGNOSIS — D32.9 MENINGIOMA (HCC): ICD-10-CM

## 2020-12-10 PROCEDURE — 99213 OFFICE O/P EST LOW 20 MIN: CPT | Performed by: NEUROLOGICAL SURGERY

## 2020-12-10 PROCEDURE — 0 GADOBENATE DIMEGLUMINE 529 MG/ML SOLUTION: Performed by: NEUROLOGICAL SURGERY

## 2020-12-10 PROCEDURE — A9577 INJ MULTIHANCE: HCPCS | Performed by: NEUROLOGICAL SURGERY

## 2020-12-10 PROCEDURE — 82565 ASSAY OF CREATININE: CPT

## 2020-12-10 PROCEDURE — 70553 MRI BRAIN STEM W/O & W/DYE: CPT

## 2020-12-10 RX ADMIN — GADOBENATE DIMEGLUMINE 12 ML: 529 INJECTION, SOLUTION INTRAVENOUS at 10:50

## 2020-12-10 NOTE — PROGRESS NOTES
Jessica Stern [facility]  1955  [id number]                        CHIEF COMPLAINT: Status post resection of pineal region meningioma         MEDICAL HISTORY SINCE LAST ENCOUNTER: This is a 65-year-old female who is 1 year subsequent to resection of a large meningioma in the pineal region.  She continue to do well with the only symptoms being that of intermittent vertigo.  She has had no seizures other than nose postoperatively.  She remains on Keppra 750 mg twice daily.  She reports today for MRI surveillance.  She will be relocating to [place] shortly.           Past Medical History:   Diagnosis Date   • Abnormal Pap smear of cervix    • Acid reflux    • Anxiety    • Atrial fibrillation (CMS/HCC)    • Depression    • Fibrocystic disease of breast    • Headache    • History of blood transfusion     after delivery   • History of bone density study 2-3 years ago    neg per pt   • History of colonoscopy     neg per pt with Dr. See   • History of IUFD     twins at 4.5 months   • History of mammogram 2018-wnl   • IBS (irritable bowel syndrome)    • Leiomyoma of uterus     Fibroids/Leiomyoma of uterus   • Migraine     Zomig PRN   • Ovarian cancer screening 2018    fibroids   • Papanicolaou smear 2018-neg   •  (spontaneous vaginal delivery)     h/o PP hemmorhage              Past Surgical History:   Procedure Laterality Date   • BLADDER SURGERY      bladder tack-mesh   • BRAIN SURGERY  2019    benign mass removed at [facility]   • BREAST CYST ASPIRATION     • CHOLECYSTECTOMY     • COLONOSCOPY  2014    wnl per pt   • CRANIOTOMY  2019   • DILATATION AND CURETTAGE     • GYNECOLOGIC CRYOSURGERY     • OTHER SURGICAL HISTORY      lumpectomies-all benign   • THROAT SURGERY      benign tumor removed at age 15   • TUBAL ABDOMINAL LIGATION Bilateral    • TUMOR REMOVAL      throat              Family History   Problem Relation Age of Onset   • Cancer Mother          brain   • Hypertension Mother    • Heart disease Father    • Hypertension Father    • Ovarian cancer Maternal Aunt    • Cervical cancer Maternal Aunt    • Colon cancer Maternal Aunt    • Uterine cancer Maternal Aunt         unsure if uterine/ovarian   • Lung cancer Maternal Aunt    • Heart disease Sister    • Hypertension Sister    • Stroke Maternal Grandmother    • Heart disease Paternal Grandfather    • Breast cancer Niece 42   • Colon cancer Maternal Cousin    • Lung disease Paternal Uncle         respiratory disease              Social History     Socioeconomic History   • Marital status:      Spouse name: Not on file   • Number of children: Not on file   • Years of education: Not on file   • Highest education level: Not on file   Tobacco Use   • Smoking status: Never Smoker   • Smokeless tobacco: Never Used   Substance and Sexual Activity   • Alcohol use: Yes   • Drug use: No   • Sexual activity: Defer     Comment: Per Rick, yes              Allergies   Allergen Reactions   • Aspirin GI Intolerance   • Codeine GI Intolerance   • Erythromycin GI Intolerance   • Penicillins Other (See Comments)     Tested positive for allergy    • Sulfa Antibiotics Other (See Comments)     Flu like symptoms                Current Outpatient Medications:   •  Bacillus Coagulans-Inulin (PROBIOTIC FORMULA PO), Probiotic Formula 10 billion cell(2 billion ea) capsule, Disp: , Rfl:   •  cholecalciferol (VITAMIN D3) 10 MCG (400 UNIT) tablet, Take 400 Units by mouth Daily., Disp: , Rfl:   •  escitalopram (LEXAPRO) 20 MG tablet, , Disp: , Rfl:   •  estradiol (Yuvafem) 10 MCG tablet vaginal tablet, Insert 1 tablet into the vagina 2 (Two) Times a Week for 30 days., Disp: 8 tablet, Rfl: 11  •  fluticasone (FLONASE) 50 MCG/ACT nasal spray, fluticasone propionate 50 mcg/actuation nasal spray,suspension, Disp: , Rfl:   •  levETIRAcetam (Keppra) 750 MG tablet, Take 1 tablet by mouth 2 (Two) Times a Day., Disp: 60 tablet, Rfl: 6  •   REPATHA SURECLICK 140 MG/ML solution auto-injector, , Disp: , Rfl:   •  TOPROL XL 25 MG 24 hr tablet, TK ONE T PO QD WITH A MEAL, Disp: , Rfl: 1  •  ZOLMitriptan (ZOMIG) 2.5 MG tablet, Take 2.5 mg by mouth 1 (One) Time As Needed for Migraine., Disp: , Rfl:   No current facility-administered medications for this visit.          Review of Systems   Constitutional: Positive for fatigue. Negative for activity change, appetite change, chills, diaphoresis, fever and unexpected weight change.   HENT: Negative for congestion, dental problem, drooling, ear discharge, ear pain, facial swelling, hearing loss, mouth sores, nosebleeds, postnasal drip, rhinorrhea, sinus pressure, sinus pain, sneezing, sore throat, tinnitus, trouble swallowing and voice change.    Eyes: Negative for photophobia, pain, discharge, redness, itching and visual disturbance.   Respiratory: Negative for apnea, cough, choking, chest tightness, shortness of breath, wheezing and stridor.    Cardiovascular: Negative for chest pain, palpitations and leg swelling.   Gastrointestinal: Negative for abdominal distention, abdominal pain, anal bleeding, blood in stool, constipation, diarrhea, nausea, rectal pain and vomiting.   Endocrine: Negative for cold intolerance, heat intolerance, polydipsia, polyphagia and polyuria.   Genitourinary: Negative for decreased urine volume, difficulty urinating, dyspareunia, dysuria, enuresis, flank pain, frequency, genital sores, hematuria, menstrual problem, pelvic pain, urgency, vaginal bleeding, vaginal discharge and vaginal pain.   Musculoskeletal: Negative for arthralgias, back pain, gait problem, joint swelling, myalgias, neck pain and neck stiffness.   Skin: Negative for color change, pallor, rash and wound.   Allergic/Immunologic: Negative for environmental allergies, food allergies and immunocompromised state.   Neurological: Positive for dizziness and light-headedness. Negative for tremors, seizures, syncope, facial  "asymmetry, speech difficulty, weakness, numbness and headaches.   Hematological: Negative for adenopathy. Does not bruise/bleed easily.   Psychiatric/Behavioral: Positive for agitation. Negative for behavioral problems, confusion, decreased concentration, dysphoric mood, hallucinations, self-injury, sleep disturbance and suicidal ideas. The patient is not nervous/anxious and is not hyperactive.    All other systems reviewed and are negative.              Vitals:    12/10/20 1303   BP: 106/68   BP Location: Right arm   Patient Position: Sitting   Cuff Size: Adult   Temp: 97.3 °F (36.3 °C)   TempSrc: Infrared   Weight: 61.2 kg (135 lb)   Height: 160 cm (63\")               EXAMINATION: Alert and oriented.  No weakness, sensory loss or reflex asymmetry.  Gait normal.            MEDICAL DECISION MAKING: MRI shows no evidence of recurrent           ASSESSMENT/DISPOSITION: When she returns from [place] EEG would be in order.  If it is normal we can probably wean her from her Keppra given the fact that her seizures occurred perioperatively and she has not had any issues for 1 year.  We will keep you informed and appreciate seeing her              I APPRECIATE THE OPPORTUNITY OF THIS REFERRAL. PLEASE CALL IF ANY       QUESTIONS [phone]          "

## 2020-12-12 DIAGNOSIS — D32.9 MENINGIOMA (HCC): ICD-10-CM

## 2020-12-14 RX ORDER — LEVETIRACETAM 750 MG/1
750 TABLET ORAL 2 TIMES DAILY
Qty: 60 TABLET | Refills: 6 | Status: SHIPPED | OUTPATIENT
Start: 2020-12-14 | End: 2021-06-22

## 2020-12-14 NOTE — TELEPHONE ENCOUNTER
Provider:  Miguealngel  Caller:  Automated refill request  Surgery:  NA  Surgery Date:    Last visit:  12/10/20  Next visit: NA    Reason for call:         Requested Prescriptions     Pending Prescriptions Disp Refills   • levETIRAcetam (KEPPRA) 750 MG tablet [Pharmacy Med Name: LEVETIRACETAM 750 MG TABLET 750 Tablet] 60 tablet 6     Sig: TAKE 1 TABLET BY MOUTH 2 (TWO) TIMES A DAY.

## 2020-12-16 LAB — CREAT BLDA-MCNC: 0.9 MG/DL (ref 0.6–1.3)

## 2021-05-21 ENCOUNTER — TELEPHONE (OUTPATIENT)
Dept: NEUROSURGERY | Facility: CLINIC | Age: 66
End: 2021-05-21

## 2021-05-21 NOTE — TELEPHONE ENCOUNTER
I spoke with Ghazala.    Please have pt. Come in to see her with her EEG and then they can discuss transfer of care to [facility] or janet    Left Deviation

## 2021-05-21 NOTE — TELEPHONE ENCOUNTER
PATIENT WAS UNDER THE CARE OF DR GOVEA AND WOULD LIKE TO SCHEDULE AN APPT.   PLEASE ADVISE ON WHO CAN TAKE OVER HER CASE.  SHE HAS SEEN DEDRICK TURCIOS, BUT NOT SURE IF SHE CAN TAKE OVER HER CARE.  PLEASE ADVISE.    [phone]

## 2021-05-21 NOTE — TELEPHONE ENCOUNTER
"Provider: Miguelangel   Caller: Patient  Phone #:  [phone]  Surgery:  NA  Surgery Date: NA    Last visit:   12/10/2020  Next visit: NA    Reason for call: Patient is requesting to see an MD. Who does she need to see?    From Dr. Means last OV note:    ASSESSMENT/DISPOSITION: \"When she returns from [place] EEG would be in order.  If it is normal we can probably wean her from her Keppra given the fact that her seizures occurred perioperatively and she has not had any issues for 1 year.  We will keep you informed and appreciate seeing her\"    "

## 2021-05-21 NOTE — TELEPHONE ENCOUNTER
Spoke to patient and relayed PA's message.    Leola Cheatham, please schedule this patient to see Ghazala.  Patient needs to have her EEG completed before appointment.

## 2021-05-25 ENCOUNTER — TRANSCRIBE ORDERS (OUTPATIENT)
Dept: ADMINISTRATIVE | Facility: HOSPITAL | Age: 66
End: 2021-05-25

## 2021-05-25 DIAGNOSIS — Z12.31 VISIT FOR SCREENING MAMMOGRAM: Primary | ICD-10-CM

## 2021-06-10 ENCOUNTER — HOSPITAL ENCOUNTER (OUTPATIENT)
Dept: MAMMOGRAPHY | Facility: HOSPITAL | Age: 66
Discharge: HOME | End: 2021-06-10
Admitting: OBSTETRICS & GYNECOLOGY

## 2021-06-10 DIAGNOSIS — Z12.31 VISIT FOR SCREENING MAMMOGRAM: ICD-10-CM

## 2021-06-10 PROCEDURE — 77067 SCR MAMMO BI INCL CAD: CPT | Performed by: RADIOLOGY

## 2021-06-10 PROCEDURE — 77067 SCR MAMMO BI INCL CAD: CPT

## 2021-06-10 PROCEDURE — 77063 BREAST TOMOSYNTHESIS BI: CPT | Performed by: RADIOLOGY

## 2021-06-10 PROCEDURE — 77063 BREAST TOMOSYNTHESIS BI: CPT

## 2021-06-18 ENCOUNTER — HOSPITAL ENCOUNTER (OUTPATIENT)
Dept: NEUROLOGY | Facility: HOSPITAL | Age: 66
Discharge: HOME | End: 2021-06-18
Admitting: INTERNAL MEDICINE

## 2021-06-18 DIAGNOSIS — D32.9 MENINGIOMA (HCC): ICD-10-CM

## 2021-06-18 PROCEDURE — 95816 EEG AWAKE AND DROWSY: CPT

## 2021-06-22 ENCOUNTER — OFFICE VISIT (OUTPATIENT)
Dept: NEUROSURGERY | Facility: CLINIC | Age: 66
End: 2021-06-22

## 2021-06-22 VITALS
HEIGHT: 63 IN | HEART RATE: 77 BPM | DIASTOLIC BLOOD PRESSURE: 62 MMHG | OXYGEN SATURATION: 99 % | TEMPERATURE: 97.6 F | SYSTOLIC BLOOD PRESSURE: 124 MMHG | BODY MASS INDEX: 24.27 KG/M2 | RESPIRATION RATE: 18 BRPM | WEIGHT: 137 LBS

## 2021-06-22 DIAGNOSIS — D32.9 MENINGIOMA (HCC): ICD-10-CM

## 2021-06-22 DIAGNOSIS — G43.109 OCULAR MIGRAINE: Primary | ICD-10-CM

## 2021-06-22 DIAGNOSIS — Z98.890 STATUS POST CRANIOTOMY: ICD-10-CM

## 2021-06-22 PROCEDURE — 99214 OFFICE O/P EST MOD 30 MIN: CPT | Performed by: PHYSICIAN ASSISTANT

## 2021-06-22 RX ORDER — LEVETIRACETAM 500 MG/1
TABLET ORAL
Qty: 60 TABLET | Refills: 3 | Status: CANCELLED | OUTPATIENT
Start: 2021-06-22

## 2021-06-22 RX ORDER — ESTRADIOL 10 UG/1
INSERT VAGINAL
COMMUNITY
Start: 2021-05-18 | End: 2021-10-26 | Stop reason: SDUPTHER

## 2021-06-22 RX ORDER — LIFITEGRAST 50 MG/ML
SOLUTION/ DROPS OPHTHALMIC
COMMUNITY
Start: 2021-06-01

## 2021-06-22 NOTE — PROGRESS NOTES
Jessica Feliciano   1955   [id number]       2021     Chief Complaint   Patient presents with   • Meningioma        HPI   Jessica is a very pleasant 66-year-old status post resection of a pineal region meningioma at [facility] 2019.  She continues to follow with us with serial brain scans.  She is currently on Keppra 750 mg in the a.m. and 500 at at bedtime, she has a follow-up EEG today and we are discussing decreasing her antiseizure medications.  Patient has described an ocular migraine in the right eye that affected her vision and her speech, she could say certain words which were garbled and could not make sense, this lasted anywhere from 10 to 30 minutes.  She did not have weakness in her face or extremities, she was with a girlfriend when this occurred.  When the migraine started she did take a Zomig and after 30 minutes her symptoms resolved.  She had an episode like this 2 years ago.    Chronic Illnesses:  Ocular migraines  Migraine headaches  Past Medical History:  No date: Abnormal Pap smear of cervix  No date: Acid reflux  No date: Anxiety  No date: Atrial fibrillation (CMS/HCC)  No date: Depression  No date: Fibrocystic disease of breast  No date: Headache  No date: History of blood transfusion      Comment:  after delivery  2-3 years ago: History of bone density study      Comment:  neg per pt  2018: History of colonoscopy      Comment:  neg per pt with Dr. See  No date: History of IUFD      Comment:  twins at 4.5 months  2018: History of mammogram      Comment:  2019-wnl  No date: IBS (irritable bowel syndrome)  No date: Leiomyoma of uterus      Comment:  Fibroids/Leiomyoma of uterus  No date: Migraine      Comment:  Zomig PRN  2018: Ovarian cancer screening      Comment:  fibroids  2018: Papanicolaou smear      Comment:  2019-neg  No date:  (spontaneous vaginal delivery)      Comment:  h/o PP hemmorhage     Past Surgical History:    Procedure Laterality Date   • BLADDER SURGERY      bladder tack-mesh   • BRAIN SURGERY  08/26/2019    benign mass removed at [facility]   • BREAST CYST ASPIRATION     • CHOLECYSTECTOMY     • COLONOSCOPY  01/01/2014    wnl per pt   • CRANIOTOMY  8/2019   • DILATATION AND CURETTAGE     • GYNECOLOGIC CRYOSURGERY     • OTHER SURGICAL HISTORY      lumpectomies-all benign   • THROAT SURGERY      benign tumor removed at age 15   • TUBAL ABDOMINAL LIGATION Bilateral    • TUMOR REMOVAL      throat        Allergies   Allergen Reactions   • Aspirin GI Intolerance   • Codeine GI Intolerance   • Erythromycin GI Intolerance   • Penicillins Other (See Comments)     Tested positive for allergy    • Sulfa Antibiotics Other (See Comments)     Flu like symptoms            Current Outpatient Medications:   •  Bacillus Coagulans-Inulin (PROBIOTIC FORMULA PO), Probiotic Formula 10 billion cell(2 billion ea) capsule, Disp: , Rfl:   •  cholecalciferol (VITAMIN D3) 10 MCG (400 UNIT) tablet, Take 400 Units by mouth Daily., Disp: , Rfl:   •  escitalopram (LEXAPRO) 20 MG tablet, , Disp: , Rfl:   •  estradiol (VAGIFEM) 10 MCG tablet vaginal tablet, INSERT 1 TABLET INTO THE VAGINA TWO TIMES A WEEK FOR 30 DAYS., Disp: , Rfl:   •  fluticasone (FLONASE) 50 MCG/ACT nasal spray, fluticasone propionate 50 mcg/actuation nasal spray,suspension, Disp: , Rfl:   •  levETIRAcetam (KEPPRA) 750 MG tablet, Take 1 tablet by mouth 2 (Two) Times a Day for 30 days. (Patient taking differently: Take 750 mg by mouth Daily.), Disp: 60 tablet, Rfl: 3  •  levETIRAcetam (KEPPRA) 750 MG tablet, TAKE 1 TABLET BY MOUTH 2 (TWO) TIMES A DAY. (Patient taking differently: Take 750 mg by mouth Daily.), Disp: 60 tablet, Rfl: 6  •  REPATHA SURECLICK 140 MG/ML solution auto-injector, , Disp: , Rfl:   •  TOPROL XL 25 MG 24 hr tablet, TK ONE T PO QD WITH A MEAL, Disp: , Rfl: 1  •  Xiidra 5 % ophthalmic solution, INSTILL 1 DROP IN BOTH EYES TWICE A DAY, Disp: , Rfl:   •  ZOLMitriptan  "(ZOMIG) 2.5 MG tablet, Take 2.5 mg by mouth 1 (One) Time As Needed for Migraine., Disp: , Rfl:      Social History     Socioeconomic History   • Marital status:      Spouse name: Not on file   • Number of children: Not on file   • Years of education: Not on file   • Highest education level: Not on file   Tobacco Use   • Smoking status: Never Smoker   • Smokeless tobacco: Never Used   Vaping Use   • Vaping Use: Never used   Substance and Sexual Activity   • Alcohol use: Yes   • Drug use: No   • Sexual activity: Defer     Comment: Per Rick, tamanna        family history includes Breast cancer (age of onset: 42) in her niece; Cancer in her mother; Cervical cancer in her maternal aunt; Colon cancer in her maternal aunt and maternal cousin; Heart disease in her father, paternal grandfather, and sister; Hypertension in her father, mother, and sister; Lung cancer in her maternal aunt; Lung disease in her paternal uncle; Ovarian cancer in her maternal aunt; Stroke in her maternal grandmother; Uterine cancer in her maternal aunt.     Social History    Tobacco Use      Smoking status: Never Smoker      Smokeless tobacco: Never Used    Body mass index is 24.27 kg/m².   Patient's Body mass index is 24.27 kg/m². indicating that she is within normal range (BMI 18.5-24.9). No BMI management plan needed.     /62   Pulse 77   Temp 97.6 °F (36.4 °C)   Resp 18   Ht 160 cm (63\")   Wt 62.1 kg (137 lb)   SpO2 99%   BMI 24.27 kg/m²    Physical Examination:  HEENT-wnl  Neck-no carotid bruits  COR-regular rate and rhythm without murmur  Lungs-No wheezing or SOB      Neurologic Exam   Cranial nerves II through XII are intact.  Gait is normal    Radiological Data Review:  I have independently reviewed the prior MRI of the brain from 6/16/2020 which is stable without evidence of recurrence.    Other diagnostic test review: EEG is normal.    Assessment and Plan:  1.  Status post pineal meningioma, [facility], 2019, " stable MRI of the brain  2.  Ocular migraine, complex migraine headache with difficulties with speech.  I am ordering CT, CTA head and neck and MRI with and without contrast for reevaluation of her tumor.  I will call her after her diagnostic studies are completed.  3.  Referral to neurology  4.  Seizures-these only occurred at the time of surgery.  We are going to start decreasing her Keppra to 500 mg twice daily she can do this for a few weeks then decrease to 1 in the evening, she will call me when she has decreased to 500 mg at bedtime.  5.  Follow-up in 1 year, I will reschedule brain MRI after review of the upcoming test.    Keisha Logan, [facility]      PCP:  Pete Barone MD

## 2021-07-17 ENCOUNTER — HOSPITAL ENCOUNTER (OUTPATIENT)
Dept: MRI IMAGING | Facility: HOSPITAL | Age: 66
Discharge: HOME | End: 2021-07-17

## 2021-07-17 ENCOUNTER — HOSPITAL ENCOUNTER (OUTPATIENT)
Dept: CT IMAGING | Facility: HOSPITAL | Age: 66
Discharge: HOME | End: 2021-07-17

## 2021-07-17 DIAGNOSIS — Z98.890 STATUS POST CRANIOTOMY: ICD-10-CM

## 2021-07-17 DIAGNOSIS — D32.9 MENINGIOMA (HCC): ICD-10-CM

## 2021-07-17 PROCEDURE — 82565 ASSAY OF CREATININE: CPT

## 2021-07-17 PROCEDURE — 0 GADOBENATE DIMEGLUMINE 529 MG/ML SOLUTION: Performed by: PHYSICIAN ASSISTANT

## 2021-07-17 PROCEDURE — 70498 CT ANGIOGRAPHY NECK: CPT

## 2021-07-17 PROCEDURE — A9577 INJ MULTIHANCE: HCPCS | Performed by: PHYSICIAN ASSISTANT

## 2021-07-17 PROCEDURE — 70496 CT ANGIOGRAPHY HEAD: CPT

## 2021-07-17 PROCEDURE — 0 IOPAMIDOL PER 1 ML: Performed by: PHYSICIAN ASSISTANT

## 2021-07-17 PROCEDURE — 70553 MRI BRAIN STEM W/O & W/DYE: CPT

## 2021-07-17 RX ADMIN — IOPAMIDOL 75 ML: 755 INJECTION, SOLUTION INTRAVENOUS at 08:12

## 2021-07-17 RX ADMIN — GADOBENATE DIMEGLUMINE 12 ML: 529 INJECTION, SOLUTION INTRAVENOUS at 08:52

## 2021-07-19 ENCOUNTER — OFFICE VISIT (OUTPATIENT)
Dept: NEUROSURGERY | Facility: CLINIC | Age: 66
End: 2021-07-19

## 2021-07-19 VITALS — BODY MASS INDEX: 23.92 KG/M2 | WEIGHT: 135 LBS | HEIGHT: 63 IN

## 2021-07-19 DIAGNOSIS — G43.109 OCULAR MIGRAINE: ICD-10-CM

## 2021-07-19 DIAGNOSIS — D32.9 MENINGIOMA (HCC): Primary | ICD-10-CM

## 2021-07-19 DIAGNOSIS — Z98.890 STATUS POST CRANIOTOMY: ICD-10-CM

## 2021-07-19 PROCEDURE — 99443 PR PHYS/QHP TELEPHONE EVALUATION 21-30 MIN: CPT | Performed by: PHYSICIAN ASSISTANT

## 2021-07-19 RX ORDER — LEVETIRACETAM 500 MG/5ML
INJECTION, SOLUTION, CONCENTRATE INTRAVENOUS
COMMUNITY
Start: 2019-08-26 | End: 2021-08-17

## 2021-07-19 NOTE — PROGRESS NOTES
Jessica Feliciano   1955   [id number]     07/19/2021     HPI:  Telemedicine: JUSTINE Castillo  Location of Provider: Office  Type of Service: consult via telemedicine  Any physical exam was assisted by the patient.  All communications with the patient (verbal, audiovisual and written) were documented in the patient's medical record per documentation standards.  Mode of transmission: Telemedicine via 2-way interactive A/V telecommunication.  Basis for telemedicine: COVID-19    You have chosen to receive care through a telephone visit. Do you consent to use a telephone visit for your medical care today? yes    CC: No worsening headaches    HPI:  This is a 66-year-old female who is status post resection of a large pineal region meningioma at [facility] October 2019.  She is followed with serial MRI scans which have been stable.  She is currently on Keppra 500 mg a day, EEG was stable 6/18/2021.  She has a history of ocular migraines most recently in June 2021, she had visual changes in her right eye and was unable to say certain words with garbled speech, her symptoms lasted anywhere from 10 to 30 minutes, she did take a Zomig at the beginning of her symptoms and after 30 minutes her symptoms had resolved.  She has an upcoming appointment with neurology regarding her complex headaches.    Past medical history, allergies, medications, surgical history, social history, family history reviewed and updated.    Social History     Tobacco Use   Smoking Status Never Smoker   Smokeless Tobacco Never Used        Body mass index is 23.91 kg/m².       Physical examination:  The patient sounds well on the phone, no cough, SOB or wheezing is noted.    Per the patient she sounds well, she states she feels well.    Review of new studies:  MRI of the brain shows postsurgical changes, no evidence of recurrent abnormal enhancement, no hemorrhage.    Assessment/Plan:  1.  Status post large pineal meningioma resection,  [facility], 2019.  Stable MRI of the brain.  2.  Seizures-EEG stable.  Will decrease Keppra to 250 mg daily and continue to wean.  Her seizures were perioperative only.  3.  Complex migraine headaches-patient scheduled to see neurology 8/17/2021.  4.  Incidentally noted multinodular appearance of the thyroid, radiology has recommended ultrasound to further evaluate.    This was an audio and video enabled telemedicine encounter. This visit has been rescheduled as a phone visit to comply with patient safety concerns in accordance with CDC recommendations.     Total time of discussion was 20 minutes.       JUSTINE Carrera William E, MD

## 2021-07-20 LAB — CREAT BLDA-MCNC: 0.8 MG/DL (ref 0.6–1.3)

## 2021-07-23 DIAGNOSIS — Z98.890 STATUS POST CRANIOTOMY: ICD-10-CM

## 2021-07-23 DIAGNOSIS — D32.9 MENINGIOMA (HCC): Primary | ICD-10-CM

## 2021-07-23 RX ORDER — LEVETIRACETAM 250 MG/1
250 TABLET ORAL 2 TIMES DAILY
Qty: 60 TABLET | Refills: 2 | Status: SHIPPED | OUTPATIENT
Start: 2021-07-23 | End: 2022-11-14

## 2021-07-23 NOTE — TELEPHONE ENCOUNTER
Provider:  Ghazala ANAYA  Caller: patient  Time of call:   4:28  Phone #:  [phone]  Surgery:  [place]  Surgery Date:    Last visit:  07/19/21   Next visit:     JOSIE:         Reason for call:     Patient requests refill on Keppra 250 mg.

## 2021-07-25 PROBLEM — E04.2 MULTIPLE THYROID NODULES: Status: ACTIVE | Noted: 2021-07-25

## 2021-08-17 ENCOUNTER — OFFICE VISIT (OUTPATIENT)
Dept: NEUROLOGY | Facility: CLINIC | Age: 66
End: 2021-08-17

## 2021-08-17 VITALS — HEART RATE: 68 BPM | OXYGEN SATURATION: 97 % | WEIGHT: 135 LBS | HEIGHT: 63 IN | BODY MASS INDEX: 23.92 KG/M2

## 2021-08-17 DIAGNOSIS — G43.109 OCULAR MIGRAINE: ICD-10-CM

## 2021-08-17 DIAGNOSIS — Z87.898 HISTORY OF SEIZURE: ICD-10-CM

## 2021-08-17 DIAGNOSIS — D32.9 MENINGIOMA (HCC): Primary | ICD-10-CM

## 2021-08-17 PROCEDURE — 99215 OFFICE O/P EST HI 40 MIN: CPT | Performed by: PHYSICIAN ASSISTANT

## 2021-08-17 NOTE — PROGRESS NOTES
"Subjective       Chief Complaint: migraines     History of Present Illness   Jessica Feliciano is a 66 y.o. female who comes to clinic today for evaluation of migraines. She initially noted symptoms several years ago marked by occasional episodes of visual disturbance, described as squiggly lines in her visual field bilaterally. She typically notes up to several episodes a year, lasting 10-30 minutes. This has potentially worsened over time. Sometimes, she notes an associated occipital headache, described as a pressure, though this is not always present. In the past, she noted nausea and vomiting as well. There are no clear aggravating factors. She takes Zomig PRN, which is beneficial.     She has a history of a large pineal region meningioma, which was resected at [facility] in 10/19. A repeat MRI of the brain in 6/21 was stable without evidence of residual or recurrent meningioma. She did have perioperative seizures, for which she was treated with Keppra. This has been tapered down to 250mg nightly. A recent EEG was unremarkable. She denies any seizure recurrence since the surgery.       I have reviewed and confirmed the past family, social and medical history as accurate on 8/17/21.     Review of Systems   Constitutional: Negative.    HENT: Negative.    Eyes: Negative.    Respiratory: Negative.    Cardiovascular: Negative.    Gastrointestinal: Negative.    Endocrine: Negative.    Genitourinary: Negative.    Musculoskeletal: Negative.    Skin: Negative.    Allergic/Immunologic: Negative.    Hematological: Negative.    Psychiatric/Behavioral: Negative.        Objective     Pulse 68   Ht 160 cm (63\")   Wt 61.2 kg (135 lb)   SpO2 97%   BMI 23.91 kg/m²     General appearance today is normal.       Physical Exam  Neurological:      Coordination: Finger-Nose-Finger Test and Heel to Shin Test normal.      Gait: Gait is intact.      Deep Tendon Reflexes: Strength normal.   Psychiatric:         Speech: " Speech normal.          Neurologic Exam     Mental Status   Speech: speech is normal   Level of consciousness: alert  Normal comprehension.     Cranial Nerves   Cranial nerves II through XII intact.     Motor Exam   Muscle bulk: normal  Overall muscle tone: normal    Strength   Strength 5/5 throughout.     Sensory Exam   Light touch normal.     Gait, Coordination, and Reflexes     Gait  Gait: normal    Coordination   Finger to nose coordination: normal  Heel to shin coordination: normal    Tremor   Resting tremor: absent          Assessment/Plan   Diagnoses and all orders for this visit:    1. Meningioma (CMS/HCC) (Primary)    2. Ocular migraine    3. History of seizure          Discussion/Summary   Jessica Feliciano comes to clinic today with a history of meningioma, s/p resection in 2019 as well as a history most consistent with ocular migraines. This was discussed in detail.  After discussing potential treatment options, it was elected to continue on Zomig PRN unchanged as this has been beneficial. It was elected to discontinue Keppra as her previous seizures only occurred perioperatively.  She will then follow up in 9-10 months, at which point we will repeat an MRI of the brain, or sooner if needed.   Total time of visit today: 40 minutes. As part of this visit I reviewed prior lab results, reviewed radiology results and reviewed radiology images. I also discussed diagnosis, prognosis, diagnostic testing, evaluation, current status, treatment options and management as discussed above.         Bere Sinha PA-C

## 2021-09-03 ENCOUNTER — TRANSCRIBE ORDERS (OUTPATIENT)
Dept: ADMINISTRATIVE | Facility: HOSPITAL | Age: 66
End: 2021-09-03

## 2021-09-03 DIAGNOSIS — E04.2 MULTINODULAR GOITER: Primary | ICD-10-CM

## 2021-09-08 ENCOUNTER — HOSPITAL ENCOUNTER (OUTPATIENT)
Dept: ULTRASOUND IMAGING | Facility: HOSPITAL | Age: 66
Discharge: HOME | End: 2021-09-08
Admitting: INTERNAL MEDICINE

## 2021-09-08 DIAGNOSIS — E04.2 MULTINODULAR GOITER: ICD-10-CM

## 2021-09-08 PROCEDURE — 76536 US EXAM OF HEAD AND NECK: CPT

## 2021-10-26 RX ORDER — ESTRADIOL 10 UG/1
INSERT VAGINAL
Qty: 8 TABLET | Refills: 1 | Status: SHIPPED | OUTPATIENT
Start: 2021-10-26 | End: 2021-11-18 | Stop reason: SDUPTHER

## 2021-10-26 RX ORDER — CLOBETASOL PROPIONATE 0.5 MG/G
CREAM TOPICAL
Qty: 15 G | Refills: 0 | Status: SHIPPED | OUTPATIENT
Start: 2021-10-26 | End: 2021-11-18 | Stop reason: SDUPTHER

## 2021-10-26 NOTE — TELEPHONE ENCOUNTER
S/w pt she requests a refill on estradiol tablet and clobetasol cream.     Last annual : 11/12/2020 with Dr. Villeda  Next annual : 11/18/2021 with Dr. Villeda    Pt. V/u pharmacy info   ([facility] pharmacy)

## 2021-10-26 NOTE — TELEPHONE ENCOUNTER
Pt called and is asking if she can get a refill on estradiol (VAGIFEM) 10 MCG tablet vaginal tablet   And  Protozoal.

## 2021-11-18 ENCOUNTER — OFFICE VISIT (OUTPATIENT)
Dept: OBSTETRICS AND GYNECOLOGY | Facility: CLINIC | Age: 66
End: 2021-11-18

## 2021-11-18 VITALS
SYSTOLIC BLOOD PRESSURE: 100 MMHG | BODY MASS INDEX: 24.1 KG/M2 | HEIGHT: 63 IN | DIASTOLIC BLOOD PRESSURE: 70 MMHG | WEIGHT: 136 LBS

## 2021-11-18 DIAGNOSIS — Z00.00 ANNUAL PHYSICAL EXAM: ICD-10-CM

## 2021-11-18 DIAGNOSIS — L29.2 VULVAR ITCHING: ICD-10-CM

## 2021-11-18 DIAGNOSIS — D32.9 MENINGIOMA (HCC): Primary | ICD-10-CM

## 2021-11-18 DIAGNOSIS — Z78.0 MENOPAUSE: ICD-10-CM

## 2021-11-18 DIAGNOSIS — N95.2 ATROPHIC VAGINITIS: ICD-10-CM

## 2021-11-18 PROCEDURE — 99397 PER PM REEVAL EST PAT 65+ YR: CPT | Performed by: OBSTETRICS & GYNECOLOGY

## 2021-11-18 RX ORDER — CLOBETASOL PROPIONATE 0.5 MG/G
CREAM TOPICAL
Qty: 15 G | Refills: 4 | Status: SHIPPED | OUTPATIENT
Start: 2021-11-18 | End: 2023-01-05 | Stop reason: SDUPTHER

## 2021-11-18 RX ORDER — ESTRADIOL 10 UG/1
INSERT VAGINAL
Qty: 8 TABLET | Refills: 11 | Status: SHIPPED | OUTPATIENT
Start: 2021-11-18 | End: 2023-01-05 | Stop reason: SDUPTHER

## 2021-11-18 RX ORDER — FLUCONAZOLE 150 MG/1
150 TABLET ORAL DAILY
Qty: 4 TABLET | Refills: 2 | Status: SHIPPED | OUTPATIENT
Start: 2021-11-18

## 2021-11-18 NOTE — PROGRESS NOTES
GYN Annual Exam     CC - Here for annual exam.        HPI  Jessica Feliciano is a 66 y.o. female, , who presents for annual well woman exam.  She is postmenopausal.  Patient denies vaginal bleeding. ..  Patient reports problems with: difficulty emptying her bladder-states has bladder mesh and usure if related. There were no changes to her medical or surgical history since her last visit.. Partner Status: Marital Status: .  New Partners since last visit: no. Patient using clobetasol cream BID for vaginal burning-some relief-asking to discuss. She requests RF today. CCUA today per patient request.       Pap due next year. Last 2020      Additional OB/GYN History   Current contraception: contraceptive methods: Post menopausal status  Desires to: do not start contraception  On HRT? Yes. Details: vagifem 10mcg-requests RF today  Last Pap :   Last Completed Pap Smear          PAP SMEAR (Every 2 Years) Next due on 2022  Pap IG, Rfx HPV ASCU    2019  Done - negative              History of abnormal Pap smear: yes - no biopsy per patient  Family history of uterine, colon, breast, or ovarian cancer: yes - family h/o breast CA, colon and cervical  Performs monthly Self-Breast Exam: yes  Last mammogram: 2021. Done at [facility].    Last Completed Mammogram          MAMMOGRAM (Every 2 Years) Next due on 6/10/2023    06/10/2021  Mammo Screening Digital Tomosynthesis Bilateral With CAD    2019  Mammo Diagnostic Digital Tomosynthesis Bilateral With CAD              Last colonoscopy:   Last Completed Colonoscopy          COLORECTAL CANCER SCREENING (COLONOSCOPY - Every 5 Years) Next due on 2018  COLONOSCOPY (Done - WNL with Dr. See)              Last DEXA: unknown date  Exercises Regularly: yes  Feelings of Anxiety or Depression: no      Tobacco Usage?: No   OB History        3    Para   3    Term   2            AB        Living   2  "      SAB        IAB        Ectopic        Molar        Multiple        Live Births   3                Health Maintenance   Topic Date Due   • DXA SCAN  Never done   • COVID-19 Vaccine (1) Never done   • TDAP/TD VACCINES (2 - Tdap) 07/26/2015   • HEPATITIS C SCREENING  Never done   • ANNUAL WELLNESS VISIT  Never done   • ZOSTER VACCINE (2 of 2) 12/27/2019   • Pneumococcal Vaccine 65+ (1 of 1 - PPSV23) Never done   • INFLUENZA VACCINE  Never done   • PAP SMEAR  11/12/2022   • COLORECTAL CANCER SCREENING  01/01/2023   • MAMMOGRAM  06/10/2023       The additional following portions of the patient's history were reviewed and updated as appropriate: allergies, current medications, past family history, past medical history, past social history and past surgical history.    Review of Systems   Constitutional: Negative.    HENT: Negative.    Eyes: Negative.    Respiratory: Negative.    Cardiovascular: Negative.    Gastrointestinal: Negative.    Endocrine: Negative.    Genitourinary: Negative.    Musculoskeletal: Negative.    Skin: Negative.    Allergic/Immunologic: Negative.    Neurological: Negative.    Hematological: Negative.    Psychiatric/Behavioral: Negative.        I have reviewed and agree with the HPI, ROS, and historical information as entered above. Kelley Villeda MD    Objective   /70   Ht 160 cm (63\")   Wt 61.7 kg (136 lb)   BMI 24.09 kg/m²     Physical Exam  Vitals and nursing note reviewed. Exam conducted with a chaperone present.   Constitutional:       Appearance: She is well-developed.   HENT:      Head: Normocephalic and atraumatic.   Neck:      Thyroid: No thyroid mass or thyromegaly.   Cardiovascular:      Rate and Rhythm: Normal rate and regular rhythm.      Heart sounds: No murmur heard.      Pulmonary:      Effort: Pulmonary effort is normal. No retractions.      Breath sounds: Normal breath sounds. No wheezing, rhonchi or rales.   Chest:      Chest wall: No mass or tenderness.   Breasts:    "   Right: Normal. No mass, nipple discharge, skin change or tenderness.      Left: Normal. No mass, nipple discharge, skin change or tenderness.       Abdominal:      General: Bowel sounds are normal.      Palpations: Abdomen is soft. Abdomen is not rigid. There is no mass.      Tenderness: There is no abdominal tenderness. There is no guarding.      Hernia: No hernia is present. There is no hernia in the left inguinal area.   Genitourinary:     Labia:         Right: No rash, tenderness or lesion.         Left: No rash, tenderness or lesion.       Vagina: Normal. No vaginal discharge or lesions.      Cervix: No cervical motion tenderness, discharge, lesion or cervical bleeding.      Uterus: Normal. Not enlarged, not fixed and not tender.       Adnexa:         Right: No mass or tenderness.          Left: No mass or tenderness.        Rectum: No external hemorrhoid.   Musculoskeletal:      Cervical back: Normal range of motion. No muscular tenderness.   Neurological:      Mental Status: She is alert and oriented to person, place, and time.   Psychiatric:         Behavior: Behavior normal.            Assessment and Plan    Problem List Items Addressed This Visit        Hematology and Neoplasia    Meningioma (HCC) - Primary    Relevant Medications    levETIRAcetam (KEPPRA) 250 MG tablet      Other Visit Diagnoses     Menopause        Relevant Medications    estradiol (VAGIFEM) 10 MCG tablet vaginal tablet    Atrophic vaginitis        Annual physical exam        Vulvar itching        Relevant Medications    clobetasol (TEMOVATE) 0.05 % cream          1. GYN annual well woman exam.   2. Reviewed monthly self breast exams.  Instructed to call with lumps, pain, or breast discharge.  Yearly mammograms ordered.  3. Recommended use of Vitamin D and getting adequate calcium in her diet. (1500mg)  4. Reviewed exercise as a preventative health measures.   5. RTC in 1 year or PRN with problems.  6. Other: not due for pap , and  weill will rx for yeast   7. No follow-ups on file. no pap  And UA nl and all is well     Kelley Villeda MD  11/18/2021

## 2021-11-30 DIAGNOSIS — L29.2 VULVAR ITCHING: ICD-10-CM

## 2021-11-30 RX ORDER — FLUCONAZOLE 150 MG/1
150 TABLET ORAL DAILY
Qty: 4 TABLET | Refills: 2 | OUTPATIENT
Start: 2021-11-30

## 2022-08-12 ENCOUNTER — TRANSCRIBE ORDERS (OUTPATIENT)
Dept: OBSTETRICS AND GYNECOLOGY | Facility: CLINIC | Age: 67
End: 2022-08-12

## 2022-08-12 DIAGNOSIS — Z12.31 VISIT FOR SCREENING MAMMOGRAM: Primary | ICD-10-CM

## 2022-08-26 ENCOUNTER — HOSPITAL ENCOUNTER (OUTPATIENT)
Dept: MAMMOGRAPHY | Facility: HOSPITAL | Age: 67
Discharge: HOME | End: 2022-08-26

## 2022-08-26 DIAGNOSIS — Z12.31 VISIT FOR SCREENING MAMMOGRAM: ICD-10-CM

## 2022-08-30 ENCOUNTER — TELEPHONE (OUTPATIENT)
Dept: OBSTETRICS AND GYNECOLOGY | Facility: CLINIC | Age: 67
End: 2022-08-30

## 2022-08-30 NOTE — TELEPHONE ENCOUNTER
Pt stated she had called to make her yearly us for her breast, and they had asked if anything was different, pt had stated she has a new sore spot and they told her to call us as she would need orders for a diagnostic instead

## 2022-09-15 ENCOUNTER — OFFICE VISIT (OUTPATIENT)
Dept: OBSTETRICS AND GYNECOLOGY | Facility: CLINIC | Age: 67
End: 2022-09-15

## 2022-09-15 VITALS
HEIGHT: 63 IN | SYSTOLIC BLOOD PRESSURE: 112 MMHG | BODY MASS INDEX: 23.88 KG/M2 | WEIGHT: 134.8 LBS | DIASTOLIC BLOOD PRESSURE: 68 MMHG

## 2022-09-15 DIAGNOSIS — N64.4 BREAST PAIN: Primary | ICD-10-CM

## 2022-09-15 PROCEDURE — 99212 OFFICE O/P EST SF 10 MIN: CPT | Performed by: OBSTETRICS & GYNECOLOGY

## 2022-09-15 NOTE — PROGRESS NOTES
OBGYN Office Note        Subjective     HPI  Jessica Feliciano is a 67 y.o. female, , who presents with breast complaints of a sore spot on Left breast.      She states she has experienced this problem for 2 weeks. The problem has improved since it began. The patient denies breast lump, changed mole, dryness, nipple discharge, pruritus, rash, skin color change and skin lesion(s). She has had a mammogram before. She does have a family history of breast cancer in her niece. They were diagnosed at age 40.. Other concerns include: none    Her last LMP was No LMP recorded. Patient is postmenopausal..          Last mammogram:   Last Completed Mammogram          Ordered - MAMMOGRAM (Every 2 Years) Ordered on 9/15/2022    06/10/2021  Mammo Screening Digital Tomosynthesis Bilateral With CAD    2019  Mammo Diagnostic Digital Tomosynthesis Bilateral With CAD              Tobacco Usage?: No     Past Medical History:   Diagnosis Date   • Abnormal Pap smear of cervix    • Acid reflux    • Anxiety    • Atrial fibrillation (HCC)    • Depression    • Fibrocystic disease of breast    • Headache    • History of blood transfusion     after delivery   • History of bone density study 2-3 years ago    neg per pt   • History of colonoscopy     neg per pt with Dr. See   • History of IUFD     twins at 4.5 months   • History of mammogram 2018-wnl   • IBS (irritable bowel syndrome)    • Leiomyoma of uterus     Fibroids/Leiomyoma of uterus   • Low back pain    • Migraine     Zomig PRN   • Ovarian cancer screening 2018    fibroids   • Papanicolaou smear 2018-neg   •  (spontaneous vaginal delivery)     h/o PP hemmorhage       Past Surgical History:   Procedure Laterality Date   • BLADDER SURGERY      bladder tack-mesh   • BRAIN SURGERY  2019    benign mass removed at [facility]   • BREAST CYST ASPIRATION     • CHOLECYSTECTOMY     • COLONOSCOPY  2014    wnl per  pt   • CRANIOTOMY  8/2019   • DILATATION AND CURETTAGE     • GYNECOLOGIC CRYOSURGERY     • OTHER SURGICAL HISTORY      lumpectomies-all benign   • THROAT SURGERY      benign tumor removed at age 15   • TUBAL ABDOMINAL LIGATION Bilateral    • TUMOR REMOVAL      throat       Family History   Problem Relation Age of Onset   • Cancer Mother         brain   • Hypertension Mother    • Heart disease Father    • Hypertension Father    • Ovarian cancer Maternal Aunt    • Cervical cancer Maternal Aunt    • Colon cancer Maternal Aunt    • Uterine cancer Maternal Aunt         unsure if uterine/ovarian   • Lung cancer Maternal Aunt    • Heart disease Sister    • Hypertension Sister    • Stroke Maternal Grandmother    • Heart disease Paternal Grandfather    • Breast cancer Niece 42   • Colon cancer Maternal Cousin    • Lung disease Paternal Uncle         respiratory disease          Current Outpatient Medications:   •  Bacillus Coagulans-Inulin (PROBIOTIC FORMULA PO), Probiotic Formula 10 billion cell(2 billion ea) capsule, Disp: , Rfl:   •  cholecalciferol (VITAMIN D3) 10 MCG (400 UNIT) tablet, Take 400 Units by mouth Daily., Disp: , Rfl:   •  clobetasol (TEMOVATE) 0.05 % cream, Apply a thin layer by topical route 2 times per day to the affected area for 2 weeks., Disp: 15 g, Rfl: 4  •  escitalopram (LEXAPRO) 20 MG tablet, , Disp: , Rfl:   •  estradiol (VAGIFEM) 10 MCG tablet vaginal tablet, Insert 1 tablet into the vaginal two times a week for 30 days, Disp: 8 tablet, Rfl: 11  •  fluconazole (Diflucan) 150 MG tablet, Take 1 tablet by mouth Daily., Disp: 4 tablet, Rfl: 2  •  fluticasone (FLONASE) 50 MCG/ACT nasal spray, fluticasone propionate 50 mcg/actuation nasal spray,suspension, Disp: , Rfl:   •  REPATHA SURECLICK 140 MG/ML solution auto-injector, , Disp: , Rfl:   •  TOPROL XL 25 MG 24 hr tablet, TK ONE T PO QD WITH A MEAL, Disp: , Rfl: 1  •  Xiidra 5 % ophthalmic solution, INSTILL 1 DROP IN BOTH EYES TWICE A DAY, Disp: ,  "Rfl:   •  ZOLMitriptan (ZOMIG) 2.5 MG tablet, Take 2.5 mg by mouth 1 (One) Time As Needed for Migraine., Disp: , Rfl:   •  levETIRAcetam (KEPPRA) 250 MG tablet, Take 1 tablet by mouth 2 (Two) Times a Day., Disp: 60 tablet, Rfl: 2     Review of Systems   Constitutional: Negative.    HENT: Negative.    Eyes: Negative.    Respiratory: Negative.    Cardiovascular: Negative.    Gastrointestinal: Negative.    Endocrine: Negative.    Genitourinary: Positive for breast pain.   Musculoskeletal: Negative.    Skin: Negative.    Allergic/Immunologic: Negative.    Neurological: Negative.    Hematological: Negative.    Psychiatric/Behavioral: Negative.        I have reviewed and agree with the HPI, ROS, and historical information as entered above. Kelley Villeda MD    Objective   /68   Ht 160 cm (63\")   Wt 61.1 kg (134 lb 12.8 oz)   BMI 23.88 kg/m²     Physical Exam  Chest:          Comments: Area of pain but no masses felt           Assessment & Plan     Assessment     Problem List Items Addressed This Visit    None     Visit Diagnoses     Breast pain    -  Primary    Relevant Orders    Mammo Diagnostic Digital Tomosynthesis Bilateral With CAD            Plan   1.  Reassured the patient that the finding is most likely benign.  2. Discussed need for futher evaluation.  3. Arranged for mammogram.   4. Will get dx mamm  5.   Return for dx mamm ordered.      Kelley Villeda MD  09/15/2022    "

## 2022-10-18 ENCOUNTER — HOSPITAL ENCOUNTER (OUTPATIENT)
Dept: MAMMOGRAPHY | Facility: HOSPITAL | Age: 67
Discharge: HOME | End: 2022-10-18
Admitting: OBSTETRICS & GYNECOLOGY

## 2022-10-18 DIAGNOSIS — N64.4 BREAST PAIN: ICD-10-CM

## 2022-10-18 PROCEDURE — G0279 TOMOSYNTHESIS, MAMMO: HCPCS | Performed by: RADIOLOGY

## 2022-10-18 PROCEDURE — 77066 DX MAMMO INCL CAD BI: CPT | Performed by: RADIOLOGY

## 2022-10-18 PROCEDURE — G0279 TOMOSYNTHESIS, MAMMO: HCPCS

## 2022-10-18 PROCEDURE — 77066 DX MAMMO INCL CAD BI: CPT

## 2022-11-14 ENCOUNTER — OFFICE VISIT (OUTPATIENT)
Dept: NEUROLOGY | Facility: CLINIC | Age: 67
End: 2022-11-14

## 2022-11-14 VITALS
OXYGEN SATURATION: 96 % | HEART RATE: 81 BPM | DIASTOLIC BLOOD PRESSURE: 60 MMHG | SYSTOLIC BLOOD PRESSURE: 92 MMHG | WEIGHT: 134.7 LBS | HEIGHT: 63 IN | BODY MASS INDEX: 23.87 KG/M2

## 2022-11-14 DIAGNOSIS — D32.9 MENINGIOMA: Primary | ICD-10-CM

## 2022-11-14 DIAGNOSIS — G43.109 OCULAR MIGRAINE: ICD-10-CM

## 2022-11-14 DIAGNOSIS — Z87.898 HISTORY OF SEIZURE: ICD-10-CM

## 2022-11-14 PROCEDURE — 99213 OFFICE O/P EST LOW 20 MIN: CPT | Performed by: PHYSICIAN ASSISTANT

## 2022-11-14 NOTE — PROGRESS NOTES
"Subjective       Chief Complaint: migraines     History of Present Illness   Jessica Feliciano is a 67 y.o. female who returns to clinic today for evaluation of migraines. She initially noted symptoms several years ago marked by occasional episodes of visual disturbance, described as squiggly lines in her visual field bilaterally. She typically notes up to several episodes a year, lasting 10-30 minutes. This has potentially worsened over time. Sometimes, she notes an associated occipital headache, described as a pressure, though this is not always present. In the past, she noted nausea and vomiting as well. There are no clear aggravating factors. She takes Zomig PRN, which is beneficial.      She has a history of a large pineal region meningioma, which was resected at [facility] in 10/19. A repeat MRI of the brain in 6/21 was stable without evidence of residual or recurrent meningioma. She did have perioperative seizures, for which she was treated with Keppra. This has been tapered down to 250mg nightly. A recent EEG was unremarkable. She denies any seizure recurrence since the surgery.     Today: Since her last visit in 8/21, she denies any seizure recurrence. Her migraines are manageable Zomig PRN.       I have reviewed and confirmed the past family, social and medical history as accurate on 11/14/22.     Review of Systems   Constitutional: Negative.    HENT: Negative.    Eyes: Negative.    Respiratory: Negative.    Cardiovascular: Negative.    Gastrointestinal: Negative.    Endocrine: Negative.    Genitourinary: Negative.    Musculoskeletal: Negative.    Skin: Negative.    Allergic/Immunologic: Negative.    Hematological: Negative.        Objective     Pulse 81   Ht 160 cm (62.99\")   Wt 61.1 kg (134 lb 11.2 oz)   SpO2 96%   BMI 23.87 kg/m²     General appearance today is normal.   Physical Exam  Neurological:      Cranial Nerves: Cranial nerves 2-12 are intact.      Motor: Motor strength is " normal.      Coordination: Finger-Nose-Finger Test normal.      Gait: Gait is intact.   Psychiatric:         Speech: Speech normal.          Neurologic Exam     Mental Status   Speech: speech is normal   Level of consciousness: alert  Normal comprehension.     Cranial Nerves   Cranial nerves II through XII intact.     Motor Exam   Muscle bulk: normal  Overall muscle tone: normal    Strength   Strength 5/5 throughout.     Gait, Coordination, and Reflexes     Gait  Gait: normal    Coordination   Finger to nose coordination: normal    Tremor   Resting tremor: absent          Assessment & Plan   Diagnoses and all orders for this visit:    1. Meningioma (HCC) (Primary)  -     MRI Brain With & Without Contrast; Future    2. Ocular migraine  -     MRI Brain With & Without Contrast; Future    3. History of seizure  -     MRI Brain With & Without Contrast; Future          Discussion/Summary   Jessica Feliciano returns to clinic today with a history of meningioma, s/p resection in 2019 as well as a history most consistent with ocular migraines and seizures. I again reviewed her current status and treatment options. It was elected to obtain an MRI of the brain . After discussing potential treatment options, it was elected to continue on Zomig PRN. She will then follow up in 1 year, or sooner if needed.   Total time of visit today: 20 minutes. As part of this visit I obtained additional history from the family which is incorporated in the HPI. I also discussed diagnosis, prognosis, diagnostic testing, evaluation, current status, treatment options and management as discussed above.         Bere Sinha PA-C

## 2022-11-23 ENCOUNTER — TRANSCRIBE ORDERS (OUTPATIENT)
Dept: OBSTETRICS AND GYNECOLOGY | Facility: CLINIC | Age: 67
End: 2022-11-23

## 2022-11-23 DIAGNOSIS — Z12.31 VISIT FOR SCREENING MAMMOGRAM: Primary | ICD-10-CM

## 2023-01-05 ENCOUNTER — HOSPITAL ENCOUNTER (OUTPATIENT)
Dept: MRI IMAGING | Facility: HOSPITAL | Age: 68
Discharge: HOME | End: 2023-01-05
Admitting: PHYSICIAN ASSISTANT
Payer: MEDICARE

## 2023-01-05 ENCOUNTER — OFFICE VISIT (OUTPATIENT)
Dept: OBSTETRICS AND GYNECOLOGY | Facility: CLINIC | Age: 68
End: 2023-01-05
Payer: MEDICARE

## 2023-01-05 VITALS — WEIGHT: 134 LBS | DIASTOLIC BLOOD PRESSURE: 72 MMHG | SYSTOLIC BLOOD PRESSURE: 108 MMHG | BODY MASS INDEX: 23.74 KG/M2

## 2023-01-05 DIAGNOSIS — D32.9 MENINGIOMA: ICD-10-CM

## 2023-01-05 DIAGNOSIS — L29.2 VULVAR ITCHING: ICD-10-CM

## 2023-01-05 DIAGNOSIS — Z78.0 MENOPAUSE: ICD-10-CM

## 2023-01-05 DIAGNOSIS — Z01.419 PAP TEST, AS PART OF ROUTINE GYNECOLOGICAL EXAMINATION: Primary | ICD-10-CM

## 2023-01-05 DIAGNOSIS — Z87.898 HISTORY OF SEIZURE: ICD-10-CM

## 2023-01-05 DIAGNOSIS — G43.109 OCULAR MIGRAINE: ICD-10-CM

## 2023-01-05 LAB — CREAT BLDA-MCNC: 0.9 MG/DL (ref 0.6–1.3)

## 2023-01-05 PROCEDURE — 0 GADOBENATE DIMEGLUMINE 529 MG/ML SOLUTION: Performed by: PHYSICIAN ASSISTANT

## 2023-01-05 PROCEDURE — 99397 PER PM REEVAL EST PAT 65+ YR: CPT | Performed by: OBSTETRICS & GYNECOLOGY

## 2023-01-05 PROCEDURE — 3015F CERV CANCER SCREEN DOCD: CPT | Performed by: OBSTETRICS & GYNECOLOGY

## 2023-01-05 PROCEDURE — A9577 INJ MULTIHANCE: HCPCS | Performed by: PHYSICIAN ASSISTANT

## 2023-01-05 PROCEDURE — 82565 ASSAY OF CREATININE: CPT

## 2023-01-05 PROCEDURE — 70553 MRI BRAIN STEM W/O & W/DYE: CPT

## 2023-01-05 RX ORDER — ESTRADIOL 10 UG/1
INSERT VAGINAL
Qty: 8 TABLET | Refills: 11 | Status: SHIPPED | OUTPATIENT
Start: 2023-01-05

## 2023-01-05 RX ORDER — CLOBETASOL PROPIONATE 0.5 MG/G
CREAM TOPICAL
Qty: 15 G | Refills: 4 | Status: SHIPPED | OUTPATIENT
Start: 2023-01-05

## 2023-01-05 RX ADMIN — GADOBENATE DIMEGLUMINE 10 ML: 529 INJECTION, SOLUTION INTRAVENOUS at 10:56

## 2023-01-05 NOTE — PROGRESS NOTES
Postmenopausal visit    Chief Complaint   Patient presents with   • Gynecologic Exam        Subjective     HPI  Jessica Feliciano is a 67 y.o. female, , who presents as a(n) established patient. She is postmenopausal. Patient reports problems with: none. Pt. reports no urinary incontinence. There were no changes to her medical or surgical history since her last visit.. Partner Status: Marital Status: .  She is not currently sexually active. STD testing recommendations have been explained to the patient and she does not desire STD testing.    Additional OB/GYN History     On HRT? No    Last Pap : 20. Results: negative. HPV: not done    Last Completed Pap Smear     This patient has no relevant Health Maintenance data.        History of abnormal Pap smear: yes - cryo done  Family history of uterine, colon, breast, or ovarian cancer: yes - aunt with uterine  Performs monthly Self-Breast Exam: yes  Last mammogram: 10/18/22. Done at .    Last Completed Mammogram          Scheduled - MAMMOGRAM (Every 2 Years) Scheduled for 10/23/2023    10/18/2022  Mammo Diagnostic Digital Tomosynthesis Bilateral With CAD    06/10/2021  Mammo Screening Digital Tomosynthesis Bilateral With CAD    2019  Mammo Diagnostic Digital Tomosynthesis Bilateral With CAD              Last colonoscopy: has had a colonoscopy 4 year(s) ago.    Last Completed Colonoscopy     This patient has no relevant Health Maintenance data.        Last DEXA: Unknown date and results were Normal  Exercises Regularly: no  Feelings of Anxiety or Depression: on medication      Tobacco Usage?: No       Current Outpatient Medications:   •  Bacillus Coagulans-Inulin (PROBIOTIC FORMULA PO), Probiotic Formula 10 billion cell(2 billion ea) capsule, Disp: , Rfl:   •  cholecalciferol (VITAMIN D3) 10 MCG (400 UNIT) tablet, Take 400 Units by mouth Daily., Disp: , Rfl:   •  clobetasol (TEMOVATE) 0.05 % cream, Apply a thin layer by  topical route 2 times per day to the affected area for 2 weeks., Disp: 15 g, Rfl: 4  •  escitalopram (LEXAPRO) 20 MG tablet, , Disp: , Rfl:   •  estradiol (VAGIFEM) 10 MCG tablet vaginal tablet, Insert 1 tablet into the vaginal two times a week for 30 days, Disp: 8 tablet, Rfl: 11  •  fluconazole (Diflucan) 150 MG tablet, Take 1 tablet by mouth Daily., Disp: 4 tablet, Rfl: 2  •  fluticasone (FLONASE) 50 MCG/ACT nasal spray, fluticasone propionate 50 mcg/actuation nasal spray,suspension, Disp: , Rfl:   •  REPATHA SURECLICK 140 MG/ML solution auto-injector, , Disp: , Rfl:   •  TOPROL XL 25 MG 24 hr tablet, TK ONE T PO QD WITH A MEAL, Disp: , Rfl: 1  •  Xiidra 5 % ophthalmic solution, INSTILL 1 DROP IN BOTH EYES TWICE A DAY, Disp: , Rfl:   •  ZOLMitriptan (ZOMIG) 2.5 MG tablet, Take 2.5 mg by mouth 1 (One) Time As Needed for Migraine., Disp: , Rfl:   No current facility-administered medications for this visit.    Patient is requesting refills of vagifem and clobetasol .    OB History        3    Para   3    Term   2            AB        Living   2       SAB        IAB        Ectopic        Molar        Multiple        Live Births   3                Past Medical History:   Diagnosis Date   • Abnormal Pap smear of cervix    • Acid reflux    • Anxiety    • Atrial fibrillation (HCC)    • Depression    • Fibrocystic disease of breast    • Headache    • History of blood transfusion     after delivery   • History of bone density study 2-3 years ago    neg per pt   • History of colonoscopy     neg per pt with Dr. See   • History of IUFD     twins at 4.5 months   • History of mammogram 2018-wnl   • Hyperlipidemia    • IBS (irritable bowel syndrome)    • Leiomyoma of uterus     Fibroids/Leiomyoma of uterus   • Low back pain    • Migraine     Zomig PRN   • Ovarian cancer screening 2018    fibroids   • Papanicolaou smear 2018-neg   • Seizures (HCC) 2019   •  Shingles    •  (spontaneous vaginal delivery)     h/o PP hemmorhage        Past Surgical History:   Procedure Laterality Date   • BLADDER SURGERY      bladder tack-mesh   • BRAIN SURGERY  2019    benign mass removed at [facility]   • BREAST CYST ASPIRATION     • CHOLECYSTECTOMY     • COLONOSCOPY  2014    wnl per pt   • CRANIOTOMY  2019   • DILATATION AND CURETTAGE     • GYNECOLOGIC CRYOSURGERY     • OTHER SURGICAL HISTORY      lumpectomies-all benign   • THROAT SURGERY      benign tumor removed at age 15   • TUBAL ABDOMINAL LIGATION Bilateral    • TUMOR REMOVAL      throat       Health Maintenance   Topic Date Due   • DXA SCAN  Never done   • COVID-19 Vaccine (1) Never done   • TDAP/TD VACCINES (2 - Tdap) 2015   • HEPATITIS C SCREENING  Never done   • ANNUAL WELLNESS VISIT  Never done   • Pneumococcal Vaccine 65+ (1 - PCV) Never done   • INFLUENZA VACCINE  Never done   • PAP SMEAR  2022   • LIPID PANEL  Never done   • COLORECTAL CANCER SCREENING  2023   • MAMMOGRAM  10/18/2024   • ZOSTER VACCINE  Completed       The additional following portions of the patient's history were reviewed and updated as appropriate: allergies, current medications, past family history, past medical history, past social history and past surgical history.    Review of Systems      I have reviewed and agree with the HPI, ROS, and historical information as entered above. Kelley Villeda MD       Objective   /72   Wt 60.8 kg (134 lb)   BMI 23.74 kg/m²     Physical Exam  Vitals and nursing note reviewed. Exam conducted with a chaperone present.   Constitutional:       Appearance: She is well-developed.   HENT:      Head: Normocephalic and atraumatic.   Neck:      Thyroid: No thyroid mass or thyromegaly.   Cardiovascular:      Rate and Rhythm: Normal rate and regular rhythm.      Heart sounds: No murmur heard.  Pulmonary:      Effort: Pulmonary effort is normal. No retractions.      Breath sounds: Normal  breath sounds. No wheezing, rhonchi or rales.   Chest:      Chest wall: No mass or tenderness.   Breasts:     Right: Normal. No mass, nipple discharge, skin change or tenderness.      Left: Normal. No mass, nipple discharge, skin change or tenderness.   Abdominal:      General: Bowel sounds are normal.      Palpations: Abdomen is soft. Abdomen is not rigid. There is no mass.      Tenderness: There is no abdominal tenderness. There is no guarding.      Hernia: No hernia is present. There is no hernia in the left inguinal area.   Genitourinary:     Labia:         Right: No rash, tenderness or lesion.         Left: No rash, tenderness or lesion.       Vagina: Normal. No vaginal discharge or lesions.      Cervix: No cervical motion tenderness, discharge, lesion or cervical bleeding.      Uterus: Normal. Not enlarged, not fixed and not tender.       Adnexa:         Right: No mass or tenderness.          Left: No mass or tenderness.        Rectum: No external hemorrhoid.   Musculoskeletal:      Cervical back: Normal range of motion. No muscular tenderness.   Neurological:      Mental Status: She is alert and oriented to person, place, and time.   Psychiatric:         Behavior: Behavior normal.         Assessment and Plan         Problem List Items Addressed This Visit    None  Visit Diagnoses     Pap test, as part of routine gynecological examination    -  Primary    Relevant Orders    LIQUID-BASED PAP SMEAR, [facility] ([facility])    Menopause        Relevant Medications    estradiol (VAGIFEM) 10 MCG tablet vaginal tablet    Vulvar itching        Relevant Medications    estradiol (VAGIFEM) 10 MCG tablet vaginal tablet    clobetasol (TEMOVATE) 0.05 % cream          1. Reviewed monthly self breast exams.  Instructed to call with lumps, pain, or breast discharge.  Yearly mammograms ordered.  2. Ordered mammogram today.  3. Reviewed exercise as a preventative health measures.   4. Colonoscopy recommended.  5. Reviewed risks  of ERT including increased risk of breast cancer, increased blood clots, increased heart disease.  Patient strongly desires to stay on or start ERT.  She understands we will use the lowest amount that adequately controls her symptoms.  6. RTC in 1 year or PRN with problems.  No follow-ups on file.    Kelley Villeda MD  01/05/2023

## 2023-01-06 LAB — REF LAB TEST METHOD: NORMAL

## 2023-10-16 ENCOUNTER — AMBULATORY SURGICAL CENTER (OUTPATIENT)
Dept: URBAN - METROPOLITAN AREA SURGERY 10 | Facility: SURGERY | Age: 68
End: 2023-10-16
Payer: MEDICARE

## 2023-10-16 ENCOUNTER — OFFICE (OUTPATIENT)
Dept: URBAN - METROPOLITAN AREA PATHOLOGY 4 | Facility: PATHOLOGY | Age: 68
End: 2023-10-16
Payer: MEDICARE

## 2023-10-16 DIAGNOSIS — Z86.010 PERSONAL HISTORY OF COLONIC POLYPS: ICD-10-CM

## 2023-10-16 DIAGNOSIS — R19.7 DIARRHEA, UNSPECIFIED: ICD-10-CM

## 2023-10-16 DIAGNOSIS — K64.0 FIRST DEGREE HEMORRHOIDS: ICD-10-CM

## 2023-10-16 DIAGNOSIS — Z09 ENCOUNTER FOR FOLLOW-UP EXAMINATION AFTER COMPLETED TREATMEN: ICD-10-CM

## 2023-10-16 DIAGNOSIS — K57.30 DIVERTICULOSIS OF LARGE INTESTINE WITHOUT PERFORATION OR ABS: ICD-10-CM

## 2023-10-16 PROCEDURE — 45380 COLONOSCOPY AND BIOPSY: CPT | Mod: PT | Performed by: INTERNAL MEDICINE

## 2023-10-16 PROCEDURE — 88305 TISSUE EXAM BY PATHOLOGIST: CPT | Performed by: INTERNAL MEDICINE

## 2023-10-17 PROBLEM — R19.7 DIARRHEA: Status: ACTIVE | Noted: 2023-10-17

## 2023-10-17 PROBLEM — Z12.11 ENCOUNTER FOR COLONOSCOPY DUE TO HISTORY OF ADENOMATOUS COLONIC POLYPS: Status: ACTIVE | Noted: 2023-10-17

## 2023-10-23 ENCOUNTER — HOSPITAL ENCOUNTER (OUTPATIENT)
Dept: MAMMOGRAPHY | Facility: HOSPITAL | Age: 68
Discharge: HOME | End: 2023-10-23
Admitting: OBSTETRICS & GYNECOLOGY
Payer: MEDICARE

## 2023-10-23 DIAGNOSIS — Z12.31 VISIT FOR SCREENING MAMMOGRAM: ICD-10-CM

## 2023-10-23 PROCEDURE — 77063 BREAST TOMOSYNTHESIS BI: CPT

## 2023-10-23 PROCEDURE — 77067 SCR MAMMO BI INCL CAD: CPT

## 2023-11-14 ENCOUNTER — OFFICE VISIT (OUTPATIENT)
Dept: NEUROLOGY | Facility: CLINIC | Age: 68
End: 2023-11-14
Payer: MEDICARE

## 2023-11-14 VITALS
SYSTOLIC BLOOD PRESSURE: 118 MMHG | WEIGHT: 135 LBS | HEART RATE: 81 BPM | OXYGEN SATURATION: 97 % | HEIGHT: 63 IN | DIASTOLIC BLOOD PRESSURE: 72 MMHG | BODY MASS INDEX: 23.92 KG/M2

## 2023-11-14 DIAGNOSIS — Z87.898 HISTORY OF SEIZURE: ICD-10-CM

## 2023-11-14 DIAGNOSIS — G43.109 OCULAR MIGRAINE: ICD-10-CM

## 2023-11-14 DIAGNOSIS — D32.9 MENINGIOMA: Primary | ICD-10-CM

## 2023-11-14 PROCEDURE — 1159F MED LIST DOCD IN RCRD: CPT | Performed by: PSYCHIATRY & NEUROLOGY

## 2023-11-14 PROCEDURE — 1160F RVW MEDS BY RX/DR IN RCRD: CPT | Performed by: PSYCHIATRY & NEUROLOGY

## 2023-11-14 PROCEDURE — 99214 OFFICE O/P EST MOD 30 MIN: CPT | Performed by: PSYCHIATRY & NEUROLOGY

## 2023-11-14 NOTE — PROGRESS NOTES
Subjective:    CC: Jessica Feliciano is seen today in consultation at the request of No ref. provider found for Meningioma       HPI:    Problem history:  Jessica Feliciano is a 67 y.o. female who returns to clinic today for evaluation of migraines. She initially noted symptoms several years ago marked by occasional episodes of visual disturbance, described as squiggly lines in her visual field bilaterally. She typically notes up to several episodes a year, lasting 10-30 minutes. This has potentially worsened over time. Sometimes, she notes an associated occipital headache, described as a pressure, though this is not always present. In the past, she noted nausea and vomiting as well. There are no clear aggravating factors. She takes Zomig PRN, which is beneficial.      She has a history of a large pineal region meningioma, which was resected at [facility] in 10/19. A repeat MRI of the brain in 6/21 was stable without evidence of residual or recurrent meningioma. She did have perioperative seizures, for which she was treated with Keppra. This has been tapered down to 250mg nightly. A recent EEG was unremarkable. She denies any seizure recurrence since the surgery.     November 2022: Since her last visit in 8/21, she denies any seizure recurrence. Her migraines are manageable Zomig PRN.     Initial visit with me: 11/14/2023:    She is in clinic for regular follow-up.  Since her last visit 1 year ago, she has done well.  Migraines remain infrequent responding well to Zomig as needed as an abortive treatment.  No breakthrough seizures.  She also completed yearly MRI in January 2023 which I reviewed personally and it does not show any evidence of residual or recurrent meningioma.  No new concerning neurological symptoms.    The following portions of the patient's history were reviewed today and updated as of 11/14/2023  : allergies, social history, and problem list.  This document will be scanned  to patient's chart.      Current Outpatient Medications:     Bacillus Coagulans-Inulin (PROBIOTIC FORMULA PO), Probiotic Formula 10 billion cell(2 billion ea) capsule, Disp: , Rfl:     cholecalciferol (VITAMIN D3) 10 MCG (400 UNIT) tablet, Take 1 tablet by mouth Daily., Disp: , Rfl:     clobetasol (TEMOVATE) 0.05 % cream, Apply a thin layer by topical route 2 times per day to the affected area for 2 weeks., Disp: 15 g, Rfl: 4    escitalopram (LEXAPRO) 20 MG tablet, , Disp: , Rfl:     estradiol (VAGIFEM) 10 MCG tablet vaginal tablet, Insert 1 tablet into the vaginal two times a week for 30 days, Disp: 8 tablet, Rfl: 11    fluticasone (FLONASE) 50 MCG/ACT nasal spray, fluticasone propionate 50 mcg/actuation nasal spray,suspension, Disp: , Rfl:     REPATHA SURECLICK 140 MG/ML solution auto-injector, , Disp: , Rfl:     TOPROL XL 25 MG 24 hr tablet, TK ONE T PO QD WITH A MEAL, Disp: , Rfl: 1    Xiidra 5 % ophthalmic solution, INSTILL 1 DROP IN BOTH EYES TWICE A DAY, Disp: , Rfl:     ZOLMitriptan (ZOMIG) 2.5 MG tablet, Take 1 tablet by mouth 1 (One) Time As Needed for Migraine., Disp: , Rfl:    Past Medical History:   Diagnosis Date    Abnormal Pap smear of cervix     Acid reflux     Anxiety     Atrial fibrillation     Depression     Headache     History of blood transfusion     after delivery    History of bone density study 2-3 years ago    neg per pt    History of colonoscopy     neg per pt with Dr. See    History of IUFD     twins at 4.5 months    History of mammogram 2018-wnl    Hyperlipidemia     IBS (irritable bowel syndrome)     Leiomyoma of uterus     Fibroids/Leiomyoma of uterus    Low back pain     Migraine     Zomig PRN    Ovarian cancer screening 2018    fibroids    Papanicolaou smear 2018-neg    Seizures 2019    Shingles      (spontaneous vaginal delivery)     h/o PP hemmorhage      Past Surgical History:   Procedure Laterality Date    BLADDER  "SURGERY      bladder tack-mesh    BRAIN SURGERY  08/26/2019    benign mass removed at [facility]    BREAST CYST ASPIRATION      BREAST EXCISIONAL BIOPSY Bilateral     BILATERAL X 2    CHOLECYSTECTOMY      COLONOSCOPY  01/01/2014    wnl per pt    CRANIOTOMY  08/2019    DILATATION AND CURETTAGE      GYNECOLOGIC CRYOSURGERY      OTHER SURGICAL HISTORY      lumpectomies-all benign    THROAT SURGERY      benign tumor removed at age 15    TUBAL ABDOMINAL LIGATION Bilateral     TUMOR REMOVAL      throat      Family History   Problem Relation Age of Onset    Cancer Mother         brain    Hypertension Mother     Heart disease Father     Hypertension Father     Neuropathy Father     Heart disease Sister     Hypertension Sister     Stroke Maternal Grandmother     Ovarian cancer Maternal Aunt         DX AGE 30'S X 2    Cervical cancer Maternal Aunt     Colon cancer Maternal Aunt     Uterine cancer Maternal Aunt         unsure if uterine/ovarian    Lung cancer Maternal Aunt     Heart disease Paternal Grandfather     Lung disease Paternal Uncle         respiratory disease    Colon cancer Maternal Cousin     Breast cancer Niece 42      Review of Systems    All other systems reviewed and are negative     Objective:    /72   Pulse 81   Ht 160 cm (63\") Comment: self reported  Wt 61.2 kg (135 lb) Comment: self reported  SpO2 97%   BMI 23.91 kg/m²     Neurology Exam:    General apperance: NAD.     Mental status: Alert, awake and oriented to time place and person.    Language and Speech: No aphasia or dysarthria.    Naming , Repitition and Comprehension:  Can name objects, repeat a sentence and follow commands. Speech is clear and fluent with good repetition, comprehension, and naming.    Cranial Nerves:   CN II: Visual fields are full. Intact. Fundi - Normal, No papillederma, Pupils - STEPHIE  CN III, IV and VI: Extraocular movements are intact. Normal saccades.   CN V: Facial sensation is intact.   CN VII: Muscles of facial " expression reveal no asymmetry. Intact.   CN VIII: Hearing is intact. Whispered voice intact.   CN IX and X: Palate elevates symmetrically. Intact  CN XI: Shoulder shrug is intact.   CN XII: Tongue is midline without evidence of atrophy or fasciculation.     Motor:  Right UE muscle strength 5/5. Normal tone.     Left UE muscle strength 5/5. Normal tone.      Right LE muscle strength5/5. Normal tone.     Left LE muscle strength 5/5. Normal tone.      Sensory: Normal light touch, vibration and pinprick sensation bilaterally.    DTRs: 2+ bilaterally in upper and lower extremities.    Babinski: Negative bilaterally.    Co-ordination: Normal finger-to-nose, heel to shin B/L.    Rhomberg: Negative.    Gait: Normal.    Cardiovascular: Regular rate and rhythm without murmur, gallop or rub.    Assessment and Plan:  1. Meningioma  2. Ocular migraine  3. History of seizure    -Patient with history of ocular migraines and a large pineal gland meningioma s/p resection at [facility] in 2019.  Since her last visit 1 year ago, ocular migraines remain infrequent responding well to Zomig as needed.  Serial MRI scans since year 2019 has shown no recurrence of meningioma.  Last MRI was in January 2023.  Continue Zomig as needed as an abortive treatment.  Since there has been no recurrence of meningioma since the surgery, my recommendation would be to change surveillance MRI frequency to every 2 years.  I will plan to see her back in clinic in 1 year for follow-up and at that time, I will consider repeating MRI.         Return in about 1 year (around 11/14/2024).     Semaj Streeter MD      Note to patient: The 21st Century Cures Act makes medical notes like these available to patients in the interest of transparency. However, be advised this is a medical document. It is intended as peer to peer communication. It is written in medical language and may contain abbreviations or verbiage that are unfamiliar. It may appear blunt or direct. Medical  documents are intended to carry relevant information, facts as evident, and the clinical opinion of the physician.

## 2024-01-11 ENCOUNTER — OFFICE VISIT (OUTPATIENT)
Dept: OBSTETRICS AND GYNECOLOGY | Facility: CLINIC | Age: 69
End: 2024-01-11
Payer: MEDICARE

## 2024-01-11 VITALS
WEIGHT: 141 LBS | DIASTOLIC BLOOD PRESSURE: 70 MMHG | BODY MASS INDEX: 24.98 KG/M2 | SYSTOLIC BLOOD PRESSURE: 118 MMHG | HEIGHT: 63 IN

## 2024-01-11 DIAGNOSIS — N95.1 POSTMENOPAUSAL DISORDER: ICD-10-CM

## 2024-01-11 DIAGNOSIS — B96.89 BV (BACTERIAL VAGINOSIS): Primary | ICD-10-CM

## 2024-01-11 DIAGNOSIS — N76.0 BV (BACTERIAL VAGINOSIS): Primary | ICD-10-CM

## 2024-01-11 RX ORDER — FLUCONAZOLE 150 MG/1
TABLET ORAL
Qty: 2 TABLET | Refills: 2 | Status: SHIPPED | OUTPATIENT
Start: 2024-01-11

## 2024-01-11 RX ORDER — METRONIDAZOLE 500 MG/1
500 TABLET ORAL 2 TIMES DAILY
Qty: 14 TABLET | Refills: 0 | Status: SHIPPED | OUTPATIENT
Start: 2024-01-11 | End: 2024-01-18

## 2024-01-11 NOTE — PROGRESS NOTES
Postmenopausal visit    Chief Complaint   Patient presents with    Follow-up     postmenopausal        Subjective     HPI  Jessica Feliciano is a 68 y.o. female, , who presents as a(n) established patient. She is postmenopausal. Patient reports problems with:  vaginal burning-patient has been evaluated by pcp and urology due to hx of bladder sling. She is using vagifem but thinking symptoms may be related to menopause . Pt. reports no urinary incontinence. There were no changes to her medical or surgical history since her last visit.. Partner Status: Marital Status: .  She is not currently sexually active. STD testing recommendations have been explained to the patient and she does not desire STD testing.    Additional OB/GYN History     On HRT? Yes. Details: vagifem-rx per pcp Dr. Barone    Last Pap : 2023. Results: negative. HPV: not done    Last Completed Pap Smear            PAP SMEAR (Every 2 Years) Next due on 2023  LIQUID-BASED PAP SMEAR, [facility] ([facility])    2020  Pap IG, Rfx HPV ASCU    2019  Done - negative                  History of abnormal Pap smear: yes - hx of cyrosurgery of cervix  Family history of uterine, colon, breast, or ovarian cancer: yes - family hx of breast, ovarian, uterine and colon CA  Performs monthly Self-Breast Exam: yes  Last mammogram: 10/23/2023. Done at .    Last Completed Mammogram            MAMMOGRAM (Every 2 Years) Next due on 10/23/2025      10/23/2023  Mammo Screening Digital Tomosynthesis Bilateral With CAD    10/18/2022  Mammo Diagnostic Digital Tomosynthesis Bilateral With CAD    06/10/2021  Mammo Screening Digital Tomosynthesis Bilateral With CAD    2019  Mammo Diagnostic Digital Tomosynthesis Bilateral With CAD                  Last colonoscopy: has had a colonoscopy 9 month(s) ago.    Last Completed Colonoscopy       This patient has no relevant Health Maintenance data.           Last DEXA: On unknown date and results were  WNL per patient  Exercises Regularly: yes  Feelings of Anxiety or Depression:  yes hx of anxiety and depression      Tobacco Usage?: No       Current Outpatient Medications:     Bacillus Coagulans-Inulin (PROBIOTIC FORMULA PO), Probiotic Formula 10 billion cell(2 billion ea) capsule, Disp: , Rfl:     cholecalciferol (VITAMIN D3) 10 MCG (400 UNIT) tablet, Take 1 tablet by mouth Daily., Disp: , Rfl:     clobetasol (TEMOVATE) 0.05 % cream, Apply a thin layer by topical route 2 times per day to the affected area for 2 weeks., Disp: 15 g, Rfl: 4    escitalopram (LEXAPRO) 20 MG tablet, , Disp: , Rfl:     estradiol (VAGIFEM) 10 MCG tablet vaginal tablet, Insert 1 tablet into the vaginal two times a week for 30 days, Disp: 8 tablet, Rfl: 11    fluticasone (FLONASE) 50 MCG/ACT nasal spray, fluticasone propionate 50 mcg/actuation nasal spray,suspension, Disp: , Rfl:     REPATHA SURECLICK 140 MG/ML solution auto-injector, , Disp: , Rfl:     TOPROL XL 25 MG 24 hr tablet, TK ONE T PO QD WITH A MEAL, Disp: , Rfl: 1    Xiidra 5 % ophthalmic solution, INSTILL 1 DROP IN BOTH EYES TWICE A DAY, Disp: , Rfl:     ZOLMitriptan (ZOMIG) 2.5 MG tablet, Take 1 tablet by mouth 1 (One) Time As Needed for Migraine., Disp: , Rfl:     fluconazole (DIFLUCAN) 150 MG tablet, Take one tablet now and repeat in 7 days, Disp: 2 tablet, Rfl: 2    metroNIDAZOLE (Flagyl) 500 MG tablet, Take 1 tablet by mouth 2 (Two) Times a Day for 7 days., Disp: 14 tablet, Rfl: 0    Patient denies the need for medication refills today.    OB History          3    Para   3    Term   2            AB        Living   2         SAB        IAB        Ectopic        Molar        Multiple        Live Births   2                Past Medical History:   Diagnosis Date    Abnormal Pap smear of cervix     Acid reflux     Anxiety     Atrial fibrillation     Depression     Headache     History of blood transfusion     after  delivery    History of bone density study 2-3 years ago    neg per pt    History of colonoscopy     neg per pt with Dr. See    History of IUFD     twins at 4.5 months    History of mammogram 2018-wnl    Hyperlipidemia     IBS (irritable bowel syndrome)     Leiomyoma of uterus     Fibroids/Leiomyoma of uterus    Low back pain     Migraine     Zomig PRN    Ovarian cancer screening 2018    fibroids    Papanicolaou smear 2018-neg    Seizures 2019    Shingles 2015     (spontaneous vaginal delivery)     h/o PP hemmorhage        Past Surgical History:   Procedure Laterality Date    BLADDER SURGERY      bladder tack-mesh    BRAIN SURGERY  2019    benign mass removed at [facility]    BREAST CYST ASPIRATION      BREAST EXCISIONAL BIOPSY Bilateral     BILATERAL X 2    CHOLECYSTECTOMY      COLONOSCOPY  2014    wnl per pt    CRANIOTOMY  2019    DILATATION AND CURETTAGE      GYNECOLOGIC CRYOSURGERY      OTHER SURGICAL HISTORY      lumpectomies-all benign    THROAT SURGERY      benign tumor removed at age 15    TUBAL ABDOMINAL LIGATION Bilateral     TUMOR REMOVAL      throat       Health Maintenance   Topic Date Due    DXA SCAN  Never done    COVID-19 Vaccine (1) Never done    TDAP/TD VACCINES (2 - Tdap) 2015    HEPATITIS C SCREENING  Never done    ANNUAL WELLNESS VISIT  Never done    Pneumococcal Vaccine 65+ (1 of 1 - PCV) Never done    LIPID PANEL  Never done    COLORECTAL CANCER SCREENING  2023    INFLUENZA VACCINE  Never done    PAP SMEAR  2025    MAMMOGRAM  10/23/2025    ZOSTER VACCINE  Completed       The additional following portions of the patient's history were reviewed and updated as appropriate: allergies, current medications, past family history, past medical history, past social history, and past surgical history.    Review of Systems   Constitutional: Negative.    HENT: Negative.     Eyes: Negative.    Respiratory: Negative.    "  Cardiovascular: Negative.    Gastrointestinal: Negative.    Endocrine: Negative.    Genitourinary:         Vaginal burning.    Musculoskeletal: Negative.    Skin: Negative.    Allergic/Immunologic: Negative.    Neurological: Negative.    Hematological: Negative.    Psychiatric/Behavioral: Negative.         I have reviewed and agree with the HPI, ROS, and historical information as entered above. Kelley Villeda MD           Objective   /70   Ht 160 cm (63\")   Wt 64 kg (141 lb)   BMI 24.98 kg/m²     Physical Exam  Vitals and nursing note reviewed. Exam conducted with a chaperone present.   Constitutional:       Appearance: She is well-developed.   HENT:      Head: Normocephalic and atraumatic.   Neck:      Thyroid: No thyroid mass or thyromegaly.   Cardiovascular:      Rate and Rhythm: Normal rate and regular rhythm.      Heart sounds: No murmur heard.  Pulmonary:      Effort: Pulmonary effort is normal. No retractions.      Breath sounds: Normal breath sounds. No wheezing, rhonchi or rales.   Chest:      Chest wall: No mass or tenderness.   Breasts:     Right: Normal. No mass, nipple discharge, skin change or tenderness.      Left: Normal. No mass, nipple discharge, skin change or tenderness.   Abdominal:      General: Bowel sounds are normal.      Palpations: Abdomen is soft. Abdomen is not rigid. There is no mass.      Tenderness: There is no abdominal tenderness. There is no guarding.      Hernia: No hernia is present. There is no hernia in the left inguinal area.   Genitourinary:     Labia:         Right: No rash, tenderness or lesion.         Left: No rash, tenderness or lesion.       Vagina: Normal. No vaginal discharge or lesions.      Cervix: No cervical motion tenderness, discharge, lesion or cervical bleeding.      Uterus: Normal. Not enlarged, not fixed and not tender.       Adnexa:         Right: No mass or tenderness.          Left: No mass or tenderness.        Rectum: No external hemorrhoid. "   Musculoskeletal:      Cervical back: Normal range of motion. No muscular tenderness.   Neurological:      Mental Status: She is alert and oriented to person, place, and time.   Psychiatric:         Behavior: Behavior normal.         Assessment and Plan         Problem List Items Addressed This Visit    None  Visit Diagnoses       BV (bacterial vaginosis)    -  Primary    Relevant Medications    metroNIDAZOLE (Flagyl) 500 MG tablet    fluconazole (DIFLUCAN) 150 MG tablet    Postmenopausal disorder            Wet smear shows poc BV poss yeast    Reviewed monthly self breast exams.  Instructed to call with lumps, pain, or breast discharge.  Yearly mammograms ordered.  Ordered mammogram today.  Recommended use of Vitamin D and getting adequate calcium in her diet. (1500mg)  Reviewed exercise as a preventative health measures.   Reviewed BMI and weight loss as preventative health measures.   Colonoscopy recommended.  RTC in 1 year or PRN with problems.  Return in about 1 year (around 1/11/2025) for Annual physical.    Kelley Villeda MD  01/11/2024

## 2024-11-15 ENCOUNTER — OFFICE VISIT (OUTPATIENT)
Dept: NEUROLOGY | Facility: CLINIC | Age: 69
End: 2024-11-15
Payer: MEDICARE

## 2024-11-15 VITALS
HEART RATE: 71 BPM | DIASTOLIC BLOOD PRESSURE: 78 MMHG | BODY MASS INDEX: 23.92 KG/M2 | HEIGHT: 63 IN | WEIGHT: 135 LBS | SYSTOLIC BLOOD PRESSURE: 108 MMHG | OXYGEN SATURATION: 97 %

## 2024-11-15 DIAGNOSIS — Z87.898 HISTORY OF SEIZURE: ICD-10-CM

## 2024-11-15 DIAGNOSIS — G43.109 OCULAR MIGRAINE: ICD-10-CM

## 2024-11-15 DIAGNOSIS — D32.9 MENINGIOMA: Primary | ICD-10-CM

## 2024-11-15 PROCEDURE — 1160F RVW MEDS BY RX/DR IN RCRD: CPT | Performed by: PSYCHIATRY & NEUROLOGY

## 2024-11-15 PROCEDURE — 99214 OFFICE O/P EST MOD 30 MIN: CPT | Performed by: PSYCHIATRY & NEUROLOGY

## 2024-11-15 PROCEDURE — 1159F MED LIST DOCD IN RCRD: CPT | Performed by: PSYCHIATRY & NEUROLOGY

## 2024-11-15 NOTE — PROGRESS NOTES
Subjective:    CC: Jessica Feliciano is in clinic today for follow up for history of meningioma and ocular migraines.    HPI:    Problem history:  Jessica Feliciano is a 67 y.o. female who returns to clinic today for evaluation of migraines. She initially noted symptoms several years ago marked by occasional episodes of visual disturbance, described as squiggly lines in her visual field bilaterally. She typically notes up to several episodes a year, lasting 10-30 minutes. This has potentially worsened over time. Sometimes, she notes an associated occipital headache, described as a pressure, though this is not always present. In the past, she noted nausea and vomiting as well. There are no clear aggravating factors. She takes Zomig PRN, which is beneficial.      She has a history of a large pineal region meningioma, which was resected at [facility] in 10/19. A repeat MRI of the brain in 6/21 was stable without evidence of residual or recurrent meningioma. She did have perioperative seizures, for which she was treated with Keppra. This has been tapered down to 250mg nightly. A recent EEG was unremarkable. She denies any seizure recurrence since the surgery.     November 2022: Since her last visit in 8/21, she denies any seizure recurrence. Her migraines are manageable Zomig PRN.     Initial visit with me: 11/14/2023:    She is in clinic for regular follow-up.  Since her last visit 1 year ago, she has done well.  Migraines remain infrequent responding well to Zomig as needed as an abortive treatment.  No breakthrough seizures.  She also completed yearly MRI in January 2023 which I reviewed personally and it does not show any evidence of residual or recurrent meningioma.  No new concerning neurological symptoms.    Follow-up: 11/15/2024: She is in clinic for regular follow-up.  Since her last visit 1 year ago, she has remained stable.  She reports that she has migraines once every 2 or 3 months  responding well to Zomig.  She denies any seizure-like activity.    The following portions of the patient's history were reviewed and updated as of 11/15/2024: allergies, social history, and problem list.       Current Outpatient Medications:     Bacillus Coagulans-Inulin (PROBIOTIC FORMULA PO), Daily., Disp: , Rfl:     cholecalciferol (VITAMIN D3) 10 MCG (400 UNIT) tablet, Take 1 tablet by mouth Daily., Disp: , Rfl:     clobetasol (TEMOVATE) 0.05 % cream, Apply a thin layer by topical route 2 times per day to the affected area for 2 weeks., Disp: 15 g, Rfl: 4    escitalopram (LEXAPRO) 10 MG tablet, Take 1 tablet by mouth Daily., Disp: , Rfl:     estradiol (VAGIFEM) 10 MCG tablet vaginal tablet, Insert 1 tablet into the vaginal two times a week for 30 days, Disp: 8 tablet, Rfl: 11    fluconazole (DIFLUCAN) 150 MG tablet, Take one tablet now and repeat in 7 days, Disp: 2 tablet, Rfl: 2    fluticasone (FLONASE) 50 MCG/ACT nasal spray, fluticasone propionate 50 mcg/actuation nasal spray,suspension, Disp: , Rfl:     REPATHA SURECLICK 140 MG/ML solution auto-injector, Inject 1 mL under the skin into the appropriate area as directed Every 30 (Thirty) Days., Disp: , Rfl:     TOPROL XL 25 MG 24 hr tablet, Take 1 tablet by mouth Daily., Disp: , Rfl: 1    Xiidra 5 % ophthalmic solution, INSTILL 1 DROP IN BOTH EYES TWICE A DAY, Disp: , Rfl:     ZOLMitriptan (ZOMIG) 2.5 MG tablet, Take 1 tablet by mouth 1 (One) Time As Needed for Migraine., Disp: , Rfl:    Past Medical History:   Diagnosis Date    Abnormal Pap smear of cervix     Acid reflux     Anxiety     Atrial fibrillation     Depression     Headache     History of blood transfusion     after delivery    History of bone density study 2-3 years ago    neg per pt    History of colonoscopy 2018    neg per pt with Dr. See    History of IUFD     twins at 4.5 months    History of mammogram 08/08/2018 11/7/2019-wnl    Hyperlipidemia     IBS (irritable bowel syndrome)      "Leiomyoma of uterus     Fibroids/Leiomyoma of uterus    Low back pain     Migraine     Zomig PRN    Ovarian cancer screening 2018    fibroids    Papanicolaou smear 2018-neg    Seizures 2019    Shingles 2015     (spontaneous vaginal delivery)     h/o PP hemmorhage      Past Surgical History:   Procedure Laterality Date    BLADDER SURGERY      bladder tack-mesh    BRAIN SURGERY  2019    benign mass removed at [facility]    BREAST CYST ASPIRATION      BREAST EXCISIONAL BIOPSY Bilateral     BILATERAL X 2    CHOLECYSTECTOMY      COLONOSCOPY  2014    wnl per pt    CRANIOTOMY  2019    DILATATION AND CURETTAGE      GYNECOLOGIC CRYOSURGERY      OTHER SURGICAL HISTORY      lumpectomies-all benign    THROAT SURGERY      benign tumor removed at age 15    TUBAL ABDOMINAL LIGATION Bilateral     TUMOR REMOVAL      throat      Family History   Problem Relation Age of Onset    Cancer Mother         brain    Hypertension Mother     Heart disease Father     Hypertension Father     Neuropathy Father     Heart disease Sister     Hypertension Sister     Stroke Maternal Grandmother     Ovarian cancer Maternal Aunt         DX AGE 30'S X 2    Cervical cancer Maternal Aunt     Colon cancer Maternal Aunt     Uterine cancer Maternal Aunt         unsure if uterine/ovarian    Lung cancer Maternal Aunt     Heart disease Paternal Grandfather     Lung disease Paternal Uncle         respiratory disease    Colon cancer Maternal Cousin     Breast cancer Niece 42        Review of Systems  Objective:    /78   Pulse 71   Ht 160 cm (62.99\")   Wt 61.2 kg (135 lb) Comment: self reported  SpO2 97%   BMI 23.92 kg/m²     Neurology Exam:  General apperance: NAD.     Mental status: Alert, awake and oriented to time place and person.    Language and Speech: No aphasia or dysarthria.    CN II to XII: Intact.    Opthalmoscopic Exam: No papilledema.    Motor:  Right UE muscle strength 5/5. Normal tone.     Left UE " muscle strength 5/5. Normal tone.      Right LE muscle strength 5/5. Normal tone.     Left LE muscle strength 5/5. Normal tone.      Sensory: Normal light touch, vibration and pinprick sensation bilaterally.    DTRs: 2+ bilaterally.    Babinski: Negative bilaterally.    Co-ordination: Normal finger-to-nose, heel to shin B/L.    Rhomberg: Negative.    Gait: Normal.    Cardiovascular: Regular rate and rhythm without murmur, gallop or rub.    Assessment and Plan:  1. Meningioma  2. Ocular migraine  3. History of seizure  Patient has history of meningioma and pineal gland region status post resection.  No breakthrough seizures.  Migraines remain under good control and responding well to Zomig.  Last MRI January 2023 did not reveal any evidence of recurrence or residual tumor.  No need for serial imaging.  Continue with current medications as it is and I will see her back in clinic in 1 year for follow-up.       I spent 30 minutes in patient care: Reviewing records prior to the visit, entering orders and documentation and spent more than adamson 50% of this time face-to-face in management, instructions and education regarding above mentioned diagnosis and also on counseling and discussing about taking medication regularly, possible side effects with medication use, importance of good sleep hygiene, good hydration and regular exercise.    Return in about 1 year (around 11/15/2025).       Note to patient: The 21st Century Cures Act makes medical notes like these available to patients in the interest of transparency. However, be advised this is a medical document. It is intended as peer to peer communication. It is written in medical language and may contain abbreviations or verbiage that are unfamiliar. It may appear blunt or direct. Medical documents are intended to carry relevant information, facts as evident, and the clinical opinion of the physician.

## 2024-11-18 ENCOUNTER — TRANSCRIBE ORDERS (OUTPATIENT)
Dept: OBSTETRICS AND GYNECOLOGY | Facility: CLINIC | Age: 69
End: 2024-11-18
Payer: MEDICARE

## 2024-11-18 DIAGNOSIS — Z12.31 VISIT FOR SCREENING MAMMOGRAM: Primary | ICD-10-CM

## 2024-11-19 ENCOUNTER — HOSPITAL ENCOUNTER (OUTPATIENT)
Dept: MAMMOGRAPHY | Facility: HOSPITAL | Age: 69
Discharge: HOME | End: 2024-11-19
Admitting: OBSTETRICS & GYNECOLOGY
Payer: MEDICARE

## 2024-11-19 DIAGNOSIS — Z12.31 VISIT FOR SCREENING MAMMOGRAM: ICD-10-CM

## 2024-11-19 LAB
NCCN CRITERIA FLAG: ABNORMAL
TYRER CUZICK SCORE: 3.5

## 2024-11-19 PROCEDURE — 77067 SCR MAMMO BI INCL CAD: CPT

## 2024-11-19 PROCEDURE — 77063 BREAST TOMOSYNTHESIS BI: CPT

## 2024-11-21 PROCEDURE — 77067 SCR MAMMO BI INCL CAD: CPT | Performed by: RADIOLOGY

## 2024-11-21 PROCEDURE — 77063 BREAST TOMOSYNTHESIS BI: CPT | Performed by: RADIOLOGY

## 2024-12-05 ENCOUNTER — DOCUMENTATION (OUTPATIENT)
Dept: GENETICS | Facility: HOSPITAL | Age: 69
End: 2024-12-05
Payer: MEDICARE

## 2025-01-16 ENCOUNTER — TELEPHONE (OUTPATIENT)
Dept: OBSTETRICS AND GYNECOLOGY | Facility: CLINIC | Age: 70
End: 2025-01-16

## 2025-01-16 NOTE — TELEPHONE ENCOUNTER
"        Hub staff attempted to follow warm transfer process and was unsuccessful     Caller: Jessica Feliciano \"[place]\"    Relationship to patient: Self    Best call back number: [phone] (home)       Patient is needing: A CALL BACK. WANTED TO KNOW IF SCHEDULE IS RUNNING ON TIME. SHE HAS TO LEAVE TO GO TO [place] THIS AFTERNOON AFTER HER APPT AT 1:20. MAY NEED TO RESCHEDULE IF MD IS RUNNING BEHIND. PLEASE CALL        "

## 2025-01-31 ENCOUNTER — OFFICE VISIT (OUTPATIENT)
Dept: OBSTETRICS AND GYNECOLOGY | Facility: CLINIC | Age: 70
End: 2025-01-31
Payer: MEDICARE

## 2025-01-31 VITALS
WEIGHT: 138 LBS | SYSTOLIC BLOOD PRESSURE: 120 MMHG | BODY MASS INDEX: 24.45 KG/M2 | HEIGHT: 63 IN | DIASTOLIC BLOOD PRESSURE: 70 MMHG

## 2025-01-31 DIAGNOSIS — R10.2 VULVAR PAIN: ICD-10-CM

## 2025-01-31 DIAGNOSIS — Z13.820 ENCOUNTER FOR OSTEOPOROSIS SCREENING IN ASYMPTOMATIC POSTMENOPAUSAL PATIENT: ICD-10-CM

## 2025-01-31 DIAGNOSIS — L29.2 VULVAR ITCHING: ICD-10-CM

## 2025-01-31 DIAGNOSIS — Z01.419 PAP TEST, AS PART OF ROUTINE GYNECOLOGICAL EXAMINATION: Primary | ICD-10-CM

## 2025-01-31 DIAGNOSIS — R30.0 DYSURIA: ICD-10-CM

## 2025-01-31 DIAGNOSIS — Z78.0 ENCOUNTER FOR OSTEOPOROSIS SCREENING IN ASYMPTOMATIC POSTMENOPAUSAL PATIENT: ICD-10-CM

## 2025-01-31 LAB
BILIRUB BLD-MCNC: NEGATIVE MG/DL
CLARITY, POC: CLEAR
COLOR UR: YELLOW
GLUCOSE UR STRIP-MCNC: NEGATIVE MG/DL
KETONES UR QL: NEGATIVE
LEUKOCYTE EST, POC: NEGATIVE
NITRITE UR-MCNC: NEGATIVE MG/ML
PH UR: 6 [PH] (ref 5–8)
PROT UR STRIP-MCNC: NEGATIVE MG/DL
RBC # UR STRIP: NEGATIVE /UL
SP GR UR: 1 (ref 1–1.03)
UROBILINOGEN UR QL: NORMAL

## 2025-01-31 RX ORDER — CLOBETASOL PROPIONATE 0.5 MG/G
1 OINTMENT TOPICAL 2 TIMES DAILY
Qty: 30 G | Refills: 6 | Status: SHIPPED | OUTPATIENT
Start: 2025-01-31

## 2025-01-31 NOTE — PROGRESS NOTES
Gynecologic Annual Exam Note        GYN Annual Exam     Chief Complaint   Patient presents with    Gynecologic Exam        Subjective     HPI  Jessica Feliciano is a 69 y.o. female, , who presents for annual well woman exam as a(n) established patient.  She is postmenopausal.  Patient denies vaginal bleeding. . There were no changes to her medical or surgical history since her last visit.. Marital Status: .  She is not currently sexually active STD testing recommendations have been explained to the patient and she declines STD testing.    The patient would like to discuss the following complaints today: vaginal burning and irritation. CCUA in office today negative. She reports taking clobetasol and HRT.   Pt. reports no urinary incontinence.     Additional OB/GYN History     On HRT? Yes. Details: vaginal suppository     Last Pap : 2023. Results: negative. HPV:  none .     Last Completed Pap Smear       This patient has no relevant Health Maintenance data.          History of abnormal Pap smear: yes - hx of cryosurgery   Family history of uterine, colon, breast, or ovarian cancer: yes - Breast: niece. BÁRBARA: colon, ovarian, uterine   Performs monthly Self-Breast Exam: yes  Last mammogram: 2024. Done at . There is a copy in the chart.    Last Completed Mammogram            MAMMOGRAM (Every 2 Years) Next due on 2026  Mammo Screening Digital Tomosynthesis Bilateral With CAD    10/23/2023  Mammo Screening Digital Tomosynthesis Bilateral With CAD    10/18/2022  Mammo Diagnostic Digital Tomosynthesis Bilateral With CAD    06/10/2021  Mammo Screening Digital Tomosynthesis Bilateral With CAD    2019  Mammo Diagnostic Digital Tomosynthesis Bilateral With CAD    Only the first 5 history entries have been loaded, but more history exists.                  Last colonoscopy: has had a colonoscopy 3 years ago    Last Completed Colonoscopy       This patient has no  relevant Health Maintenance data.            Her last DXA was several years ago. WNL per pt   Exercises Regularly: yes  Feelings of Anxiety or Depression: no      Tobacco Usage?: No       Current Outpatient Medications:     Bacillus Coagulans-Inulin (PROBIOTIC FORMULA PO), Daily., Disp: , Rfl:     cholecalciferol (VITAMIN D3) 10 MCG (400 UNIT) tablet, Take 1 tablet by mouth Daily., Disp: , Rfl:     clobetasol (Temovate) 0.05 % ointment, Apply 1 Application topically to the appropriate area as directed 2 (Two) Times a Day., Disp: 30 g, Rfl: 6    escitalopram (LEXAPRO) 10 MG tablet, Take 1 tablet by mouth Daily., Disp: , Rfl:     estradiol (VAGIFEM) 10 MCG tablet vaginal tablet, Insert 1 tablet into the vaginal two times a week for 30 days, Disp: 8 tablet, Rfl: 11    fluconazole (DIFLUCAN) 150 MG tablet, Take one tablet now and repeat in 7 days, Disp: 2 tablet, Rfl: 2    fluticasone (FLONASE) 50 MCG/ACT nasal spray, fluticasone propionate 50 mcg/actuation nasal spray,suspension, Disp: , Rfl:     lidocaine (XYLOCAINE) 2 % jelly, Apply  topically to the appropriate area as directed As Needed for Mild Pain., Disp: 3.5 g, Rfl: 4    REPATHA SURECLICK 140 MG/ML solution auto-injector, Inject 1 mL under the skin into the appropriate area as directed Every 30 (Thirty) Days., Disp: , Rfl:     TOPROL XL 25 MG 24 hr tablet, Take 1 tablet by mouth Daily., Disp: , Rfl: 1    Xiidra 5 % ophthalmic solution, INSTILL 1 DROP IN BOTH EYES TWICE A DAY, Disp: , Rfl:     ZOLMitriptan (ZOMIG) 2.5 MG tablet, Take 1 tablet by mouth 1 (One) Time As Needed for Migraine., Disp: , Rfl:     Patient denies the need for medication refills today.    OB History          3    Para   3    Term   2            AB        Living   2         SAB        IAB        Ectopic        Molar        Multiple        Live Births   2                Past Medical History:   Diagnosis Date    Abnormal Pap smear of cervix     Acid reflux     Anxiety     Atrial  fibrillation     Depression     Headache     History of blood transfusion     after delivery    History of bone density study 2-3 years ago    neg per pt    History of colonoscopy     neg per pt with Dr. See    History of IUFD     twins at 4.5 months    History of mammogram 2018-wnl    Hyperlipidemia     IBS (irritable bowel syndrome)     Leiomyoma of uterus     Fibroids/Leiomyoma of uterus    Low back pain     Migraine     Zomig PRN    Ovarian cancer screening 2018    fibroids    Papanicolaou smear 2018-neg    Seizures 2019    Shingles 2015     (spontaneous vaginal delivery)     h/o PP hemmorhage        Past Surgical History:   Procedure Laterality Date    BLADDER SURGERY      bladder tack-mesh    BRAIN SURGERY  2019    benign mass removed at [facility]    BREAST CYST ASPIRATION      BREAST EXCISIONAL BIOPSY Bilateral     BILATERAL X 2    CHOLECYSTECTOMY      COLONOSCOPY  2014    wnl per pt    CRANIOTOMY  2019    DILATATION AND CURETTAGE      GYNECOLOGIC CRYOSURGERY      OTHER SURGICAL HISTORY      lumpectomies-all benign    THROAT SURGERY      benign tumor removed at age 15    TUBAL ABDOMINAL LIGATION Bilateral     TUMOR REMOVAL      throat       Health Maintenance   Topic Date Due    LIPID PANEL  Never done    DXA SCAN  Never done    Pneumococcal Vaccine 65+ (1 of 1 - PCV) Never done    TDAP/TD VACCINES (2 - Tdap) 2015    HEPATITIS C SCREENING  Never done    ANNUAL WELLNESS VISIT  Never done    COLORECTAL CANCER SCREENING  2023    INFLUENZA VACCINE  Never done    COVID-19 Vaccine ( - - season) Never done    PAP SMEAR  2025    MAMMOGRAM  2026    ZOSTER VACCINE  Completed       The additional following portions of the patient's history were reviewed and updated as appropriate: allergies, current medications, past family history, past medical history, past social history, past surgical history, and problem  "list.    Review of Systems   Constitutional: Negative.    HENT: Negative.     Eyes: Negative.    Respiratory: Negative.     Cardiovascular: Negative.    Gastrointestinal: Negative.    Endocrine: Negative.    Genitourinary: Negative.         Vaginal irritation    Musculoskeletal: Negative.    Skin: Negative.    Allergic/Immunologic: Negative.    Neurological: Negative.    Hematological: Negative.    Psychiatric/Behavioral: Negative.           I have reviewed and agree with the HPI, ROS, and historical information as entered above. Kelley Villeda MD           Objective   /70 (BP Location: Right arm)   Ht 160 cm (63\")   Wt 62.6 kg (138 lb)   BMI 24.45 kg/m²     Physical Exam  Vitals and nursing note reviewed. Exam conducted with a chaperone present.   Constitutional:       Appearance: She is well-developed.   HENT:      Head: Normocephalic and atraumatic.   Neck:      Thyroid: No thyroid mass or thyromegaly.   Cardiovascular:      Rate and Rhythm: Normal rate and regular rhythm.      Heart sounds: No murmur heard.  Pulmonary:      Effort: Pulmonary effort is normal. No retractions.      Breath sounds: Normal breath sounds. No wheezing, rhonchi or rales.   Chest:      Chest wall: No mass or tenderness.   Breasts:     Right: Normal. No mass, nipple discharge, skin change or tenderness.      Left: Normal. No mass, nipple discharge, skin change or tenderness.   Abdominal:      General: Bowel sounds are normal.      Palpations: Abdomen is soft. Abdomen is not rigid. There is no mass.      Tenderness: There is no abdominal tenderness. There is no guarding.      Hernia: No hernia is present. There is no hernia in the left inguinal area.   Genitourinary:     Labia:         Right: No rash, tenderness or lesion.         Left: No rash, tenderness or lesion.       Vagina: Normal. No vaginal discharge or lesions.      Cervix: No cervical motion tenderness, discharge, lesion or cervical bleeding.      Uterus: Normal. Not " enlarged, not fixed and not tender.       Adnexa:         Right: No mass or tenderness.          Left: No mass or tenderness.        Rectum: No external hemorrhoid.          Comments: Burning here but no lesions seen   Musculoskeletal:      Cervical back: Normal range of motion. No muscular tenderness.   Neurological:      Mental Status: She is alert and oriented to person, place, and time.   Psychiatric:         Behavior: Behavior normal.            Assessment and Plan    Problem List Items Addressed This Visit    None  Visit Diagnoses       Pap test, as part of routine gynecological examination    -  Primary    Relevant Orders    LIQUID-BASED PAP SMEAR WITH HPV GENOTYPING IF ASCUS ([facility])    Dysuria        Relevant Orders    POC Urinalysis Dipstick (Completed)    Vulvar pain        Relevant Medications    lidocaine (XYLOCAINE) 2 % jelly    clobetasol (Temovate) 0.05 % ointment    Vulvar itching            Vaseline prn and we will rx Tem oint and lidocaine jelly     GYN annual well woman exam.   Reviewed monthly self breast exams.  Instructed to call with lumps, pain, or breast discharge.  Yearly mammograms ordered.  Ordered mammogram today.  Recommended use of Vitamin D and getting adequate calcium in her diet. (1500mg)  Reviewed exercise as a preventative health measures.   Reviewed BMI and weight loss as preventative health measures.   Colonoscopy recommended.  RTC in 1 year or PRN with problems.  No follow-ups on file.    Kelley Villeda MD  01/31/2025

## 2025-02-03 LAB — REF LAB TEST METHOD: NORMAL
